# Patient Record
Sex: MALE | Race: WHITE | Employment: FULL TIME | URBAN - NONMETROPOLITAN AREA
[De-identification: names, ages, dates, MRNs, and addresses within clinical notes are randomized per-mention and may not be internally consistent; named-entity substitution may affect disease eponyms.]

---

## 2018-01-09 ENCOUNTER — HOSPITAL ENCOUNTER (OUTPATIENT)
Dept: PREADMISSION TESTING | Age: 43
Discharge: HOME | End: 2018-01-09
Payer: COMMERCIAL

## 2018-01-09 ENCOUNTER — HOSPITAL ENCOUNTER (OUTPATIENT)
Dept: GENERAL RADIOLOGY | Age: 43
Discharge: HOME | End: 2018-01-09
Payer: COMMERCIAL

## 2018-01-09 VITALS
RESPIRATION RATE: 16 BRPM | TEMPERATURE: 97.6 F | OXYGEN SATURATION: 96 % | WEIGHT: 291.01 LBS | HEART RATE: 91 BPM | SYSTOLIC BLOOD PRESSURE: 109 MMHG | BODY MASS INDEX: 45.67 KG/M2 | DIASTOLIC BLOOD PRESSURE: 73 MMHG | HEIGHT: 67 IN

## 2018-01-09 DIAGNOSIS — Z01.818 PRE-OP TESTING: ICD-10-CM

## 2018-01-09 LAB
ANION GAP SERPL CALCULATED.3IONS-SCNC: 16 MEQ/L (ref 8–16)
APTT: 29.8 SECONDS (ref 22–38)
AVERAGE GLUCOSE: 150 MG/DL (ref 70–126)
BUN BLDV-MCNC: 21 MG/DL (ref 7–22)
CALCIUM SERPL-MCNC: 9.9 MG/DL (ref 8.5–10.5)
CHLORIDE BLD-SCNC: 96 MEQ/L (ref 98–111)
CO2: 28 MEQ/L (ref 23–33)
CREAT SERPL-MCNC: 0.8 MG/DL (ref 0.4–1.2)
EKG ATRIAL RATE: 90 BPM
EKG P AXIS: 56 DEGREES
EKG P-R INTERVAL: 122 MS
EKG Q-T INTERVAL: 348 MS
EKG QRS DURATION: 86 MS
EKG QTC CALCULATION (BAZETT): 425 MS
EKG R AXIS: 62 DEGREES
EKG T AXIS: 20 DEGREES
EKG VENTRICULAR RATE: 90 BPM
GFR SERPL CREATININE-BSD FRML MDRD: > 90 ML/MIN/1.73M2
GLUCOSE BLD-MCNC: 129 MG/DL (ref 70–108)
HBA1C MFR BLD: 7 % (ref 4.4–6.4)
HCT VFR BLD CALC: 46.1 % (ref 42–52)
HEMOGLOBIN: 15.7 GM/DL (ref 14–18)
INR BLD: 0.96 (ref 0.85–1.13)
MCH RBC QN AUTO: 29.1 PG (ref 27–31)
MCHC RBC AUTO-ENTMCNC: 34.1 GM/DL (ref 33–37)
MCV RBC AUTO: 85.3 FL (ref 80–94)
MRSA NASAL SCREEN RT-PCR: NEGATIVE
PDW BLD-RTO: 13.5 % (ref 11.5–14.5)
PLATELET # BLD: 293 THOU/MM3 (ref 130–400)
PMV BLD AUTO: 8.1 MCM (ref 7.4–10.4)
POTASSIUM SERPL-SCNC: 4.4 MEQ/L (ref 3.5–5.2)
RBC # BLD: 5.41 MILL/MM3 (ref 4.7–6.1)
SODIUM BLD-SCNC: 140 MEQ/L (ref 135–145)
STAPH AUREUS SCREEN RT-PCR: POSITIVE
WBC # BLD: 12.8 THOU/MM3 (ref 4.8–10.8)

## 2018-01-09 PROCEDURE — 93005 ELECTROCARDIOGRAM TRACING: CPT

## 2018-01-09 PROCEDURE — 85610 PROTHROMBIN TIME: CPT

## 2018-01-09 PROCEDURE — 80048 BASIC METABOLIC PNL TOTAL CA: CPT

## 2018-01-09 PROCEDURE — 87081 CULTURE SCREEN ONLY: CPT

## 2018-01-09 PROCEDURE — 85730 THROMBOPLASTIN TIME PARTIAL: CPT

## 2018-01-09 PROCEDURE — 85027 COMPLETE CBC AUTOMATED: CPT

## 2018-01-09 PROCEDURE — 83036 HEMOGLOBIN GLYCOSYLATED A1C: CPT

## 2018-01-09 PROCEDURE — 87641 MR-STAPH DNA AMP PROBE: CPT

## 2018-01-09 PROCEDURE — 36415 COLL VENOUS BLD VENIPUNCTURE: CPT

## 2018-01-09 PROCEDURE — 71046 X-RAY EXAM CHEST 2 VIEWS: CPT

## 2018-01-09 RX ORDER — DULOXETIN HYDROCHLORIDE 30 MG/1
30 CAPSULE, DELAYED RELEASE ORAL DAILY
COMMUNITY

## 2018-01-09 RX ORDER — OMEGA-3/DHA/EPA/FISH OIL 60 MG-90MG
1 CAPSULE ORAL DAILY
COMMUNITY

## 2018-01-09 RX ORDER — ARMODAFINIL 150 MG/1
200 TABLET ORAL DAILY
COMMUNITY

## 2018-01-09 ASSESSMENT — PAIN SCALES - GENERAL: PAINLEVEL_OUTOF10: 4

## 2018-01-09 ASSESSMENT — PAIN DESCRIPTION - FREQUENCY: FREQUENCY: CONTINUOUS

## 2018-01-09 ASSESSMENT — PAIN DESCRIPTION - PROGRESSION: CLINICAL_PROGRESSION: RAPIDLY WORSENING

## 2018-01-09 ASSESSMENT — PAIN DESCRIPTION - LOCATION: LOCATION: BACK

## 2018-01-09 ASSESSMENT — PAIN DESCRIPTION - DESCRIPTORS: DESCRIPTORS: STABBING;SHARP

## 2018-01-09 ASSESSMENT — PAIN DESCRIPTION - ORIENTATION: ORIENTATION: LOWER;LEFT;RIGHT

## 2018-01-09 ASSESSMENT — PAIN DESCRIPTION - PAIN TYPE: TYPE: CHRONIC PAIN

## 2018-01-09 NOTE — PROGRESS NOTES
In preparation for their surgical procedure above patient was screened for Obstructive Sleep Apnea (BRANDI) using the STOP-Bang Questionnaire by the Pre-Admission Testing department. This is a pre-surgical screening tool for patient safety and serves as a recommendation, this WILL NOT cause cancellation of surgery. STOP-Bang Questionnaire  * Do you currently see a pulmonologist?  Yes     If yes STOP, do not complete. Patient follows with Sleep clinic at [facility] . Uses CPAP, patient instructed to bring it with him for surgery, understanding verbalized        Adapted from:   STOP Questionnaire: A Tool to Screen Patients for Obstructive Sleep Apnea   NABEEL Zavala.P.C., ROSHAN Quinn.B.B.S., Edgardo Hazel M.D., [facility] . Jayla Ling, Ph.D., Brigitte Figueroa M.B.B.S., Jimenez Cook M.Sc., Vignesh Saavedra M.D., Reyna Beckwith. DAVID Woo.C.P.C.    Anesthesiology 2008; 765:830-31 Copyright 2008, the [address] of Anesthesiologists, [place] [address].   ----------------------------------------------------------------------------------------------------------------

## 2018-01-11 LAB — MRSA SCREEN: NORMAL

## 2018-01-11 NOTE — PROGRESS NOTES
Keeping You Safe for Surgery    Staphylococcus aureus or Nayana Holes is a germ that lives on the skin and in the nose of some healthy people. Your skin protects you from those germs. However, when you have surgery, we will be cutting your skin. Sometimes germs can get into those cuts and cause infection. How do we screen for Staph? We will swab your nose to see if you are a carrier of Staph. The results will be available about 2 hours after we obtain the nasal specimen. A positive test does not mean you have an infection; it just means you are a carrier of Staph. Your surgery most likely will not be canceled or delayed. If my test is positive, what happens? If your test is positive, a nurse will call you and tell you to get Mupirocin Ointment (Bactroban) at your pharmacy. The medicine may come in one large tube or may come in many small packets. When the medication is administered into your nose, it works by killing some of the germs that could cause you to get a surgical site infection.  If you get a big tube of Mupirocin, place enough medicine to cover the tip of a cotton swab (Q-tip). Place the cotton swab inside one nostril and rub the medicine onto the inside of the nose. Then repeat this same procedure in your other nostril.  If you get small individual tubes, put half the tube on a cotton swab and put the medicine in one nostril. Then the other half in the other nostril. After the medication has been inserted into each nostril, gently press your nose together and release for about a minute to get the medicine all over the inside of your nose. Do this once in the morning and once at night for 5 days prior to your scheduled surgery date. If I have Staph, will I be treated differently in the hospital?  If you have a certain type of Staph called MRSA (Methicillin-Resistant Staph aureus), you will be in a single room on Contact Precautions.  This means your doctors and nurses will wear gloves and gowns when taking care of you. We do this (along with good hand hygiene) to make sure we do not spread MRSA to any another patients in our care. SURGERY PREPARATION CHECKLIST     NAME: ________________________________________     DATE OF SURGERY: ____________________________    Enter Dates, Check (?) circles to indicate task is completed. Date MUPIROCIN NASAL OINTMENT BODY CLEANSING     DAY 1 ______________________   Morning    Evening          Day 2 ______________________   Morning     Evening        Day 3 ______________________   Morning  Evening        Day 4 ______________________   Morning    Evening        Day 5 ______________________   Morning  Evening        Day 6 ______________________  (Day of Surgery)               PLEASE COMPLETE and BRING THIS CHECKLIST WITH YOU TO [address] to give to your nurse on the day of surgery. You will be notified if you need to use Mupirocin Nasal Ointment.

## 2018-01-23 ENCOUNTER — APPOINTMENT (OUTPATIENT)
Dept: GENERAL RADIOLOGY | Age: 43
DRG: 460 | End: 2018-01-23
Attending: ORTHOPAEDIC SURGERY
Payer: COMMERCIAL

## 2018-01-23 ENCOUNTER — ANESTHESIA (OUTPATIENT)
Dept: OPERATING ROOM | Age: 43
DRG: 460 | End: 2018-01-23
Payer: COMMERCIAL

## 2018-01-23 ENCOUNTER — ANESTHESIA EVENT (OUTPATIENT)
Dept: OPERATING ROOM | Age: 43
DRG: 460 | End: 2018-01-23
Payer: COMMERCIAL

## 2018-01-23 ENCOUNTER — HOSPITAL ENCOUNTER (INPATIENT)
Age: 43
LOS: 3 days | Discharge: HOME | DRG: 460 | End: 2018-01-26
Attending: ORTHOPAEDIC SURGERY | Admitting: ORTHOPAEDIC SURGERY
Payer: COMMERCIAL

## 2018-01-23 VITALS
DIASTOLIC BLOOD PRESSURE: 86 MMHG | SYSTOLIC BLOOD PRESSURE: 172 MMHG | TEMPERATURE: 98.1 F | RESPIRATION RATE: 3 BRPM | OXYGEN SATURATION: 98 %

## 2018-01-23 PROBLEM — M48.061 DEGENERATIVE LUMBAR SPINAL STENOSIS: Status: ACTIVE | Noted: 2018-01-23

## 2018-01-23 LAB
ABO: NORMAL
ANTIBODY SCREEN: NORMAL
GLUCOSE BLD-MCNC: 133 MG/DL (ref 70–108)
GLUCOSE BLD-MCNC: 298 MG/DL (ref 70–108)
RH FACTOR: NORMAL

## 2018-01-23 PROCEDURE — 72020 X-RAY EXAM OF SPINE 1 VIEW: CPT

## 2018-01-23 PROCEDURE — 6370000000 HC RX 637 (ALT 250 FOR IP): Performed by: ORTHOPAEDIC SURGERY

## 2018-01-23 PROCEDURE — 3600000005 HC SURGERY LEVEL 5 BASE: Performed by: ORTHOPAEDIC SURGERY

## 2018-01-23 PROCEDURE — 36415 COLL VENOUS BLD VENIPUNCTURE: CPT

## 2018-01-23 PROCEDURE — 3600000015 HC SURGERY LEVEL 5 ADDTL 15MIN: Performed by: ORTHOPAEDIC SURGERY

## 2018-01-23 PROCEDURE — 6370000000 HC RX 637 (ALT 250 FOR IP): Performed by: PHYSICIAN ASSISTANT

## 2018-01-23 PROCEDURE — 2580000003 HC RX 258: Performed by: PHYSICIAN ASSISTANT

## 2018-01-23 PROCEDURE — 2500000003 HC RX 250 WO HCPCS: Performed by: ORTHOPAEDIC SURGERY

## 2018-01-23 PROCEDURE — 6360000002 HC RX W HCPCS: Performed by: ORTHOPAEDIC SURGERY

## 2018-01-23 PROCEDURE — 3700000001 HC ADD 15 MINUTES (ANESTHESIA): Performed by: ORTHOPAEDIC SURGERY

## 2018-01-23 PROCEDURE — 1200000000 HC SEMI PRIVATE

## 2018-01-23 PROCEDURE — 3700000000 HC ANESTHESIA ATTENDED CARE: Performed by: ORTHOPAEDIC SURGERY

## 2018-01-23 PROCEDURE — 6360000002 HC RX W HCPCS: Performed by: PHYSICIAN ASSISTANT

## 2018-01-23 PROCEDURE — 2720000010 HC SURG SUPPLY STERILE: Performed by: ORTHOPAEDIC SURGERY

## 2018-01-23 PROCEDURE — 6360000002 HC RX W HCPCS: Performed by: NURSE ANESTHETIST, CERTIFIED REGISTERED

## 2018-01-23 PROCEDURE — 7100000000 HC PACU RECOVERY - FIRST 15 MIN: Performed by: ORTHOPAEDIC SURGERY

## 2018-01-23 PROCEDURE — 6360000002 HC RX W HCPCS: Performed by: INTERNAL MEDICINE

## 2018-01-23 PROCEDURE — A6252 ABSORPT DRG >16 <=48 W/O BDR: HCPCS | Performed by: ORTHOPAEDIC SURGERY

## 2018-01-23 PROCEDURE — 86900 BLOOD TYPING SEROLOGIC ABO: CPT

## 2018-01-23 PROCEDURE — 95940 IONM IN OPERATNG ROOM 15 MIN: CPT | Performed by: ORTHOPAEDIC SURGERY

## 2018-01-23 PROCEDURE — 2500000003 HC RX 250 WO HCPCS: Performed by: NURSE ANESTHETIST, CERTIFIED REGISTERED

## 2018-01-23 PROCEDURE — 00QT0ZZ REPAIR SPINAL MENINGES, OPEN APPROACH: ICD-10-PCS | Performed by: ORTHOPAEDIC SURGERY

## 2018-01-23 PROCEDURE — 7100000001 HC PACU RECOVERY - ADDTL 15 MIN: Performed by: ORTHOPAEDIC SURGERY

## 2018-01-23 PROCEDURE — 0SG1071 FUSION OF 2 OR MORE LUMBAR VERTEBRAL JOINTS WITH AUTOLOGOUS TISSUE SUBSTITUTE, POSTERIOR APPROACH, POSTERIOR COLUMN, OPEN APPROACH: ICD-10-PCS | Performed by: ORTHOPAEDIC SURGERY

## 2018-01-23 PROCEDURE — 6370000000 HC RX 637 (ALT 250 FOR IP): Performed by: INTERNAL MEDICINE

## 2018-01-23 PROCEDURE — C1713 ANCHOR/SCREW BN/BN,TIS/BN: HCPCS | Performed by: ORTHOPAEDIC SURGERY

## 2018-01-23 PROCEDURE — 86901 BLOOD TYPING SEROLOGIC RH(D): CPT

## 2018-01-23 PROCEDURE — 82948 REAGENT STRIP/BLOOD GLUCOSE: CPT

## 2018-01-23 PROCEDURE — 2500000003 HC RX 250 WO HCPCS: Performed by: ANESTHESIOLOGY

## 2018-01-23 PROCEDURE — 2580000003 HC RX 258: Performed by: ORTHOPAEDIC SURGERY

## 2018-01-23 PROCEDURE — 86850 RBC ANTIBODY SCREEN: CPT

## 2018-01-23 PROCEDURE — 2580000003 HC RX 258: Performed by: NURSE ANESTHETIST, CERTIFIED REGISTERED

## 2018-01-23 DEVICE — ROD 8690100 CDH PBNT 5.5X100 TI
Type: IMPLANTABLE DEVICE | Site: SPINE LUMBAR | Status: FUNCTIONAL
Brand: CD HORIZON® SPINAL SYSTEM

## 2018-01-23 DEVICE — BONE GRAFT KIT 7510800 INFUSE LARGE II
Type: IMPLANTABLE DEVICE | Site: SPINE LUMBAR | Status: FUNCTIONAL
Brand: INFUSE® BONE GRAFT

## 2018-01-23 DEVICE — DBM T42200 2.5CMX10CM 2 EACH GRAFTON MAT
Type: IMPLANTABLE DEVICE | Site: SPINE LUMBAR | Status: FUNCTIONAL
Brand: GRAFTON®AND GRAFTON PLUS®DEMINERALIZED BONE MATRIX (DBM)

## 2018-01-23 DEVICE — CROSSLINK 811-321 LP MLTSP PL L 1.551.75
Type: IMPLANTABLE DEVICE | Site: SPINE LUMBAR | Status: FUNCTIONAL
Brand: TSRH® SPINAL SYSTEM

## 2018-01-23 RX ORDER — SODIUM CHLORIDE 9 MG/ML
INJECTION, SOLUTION INTRAVENOUS CONTINUOUS
Status: DISCONTINUED | OUTPATIENT
Start: 2018-01-23 | End: 2018-01-23

## 2018-01-23 RX ORDER — SODIUM CHLORIDE 9 MG/ML
INJECTION, SOLUTION INTRAVENOUS CONTINUOUS PRN
Status: DISCONTINUED | OUTPATIENT
Start: 2018-01-23 | End: 2018-01-23 | Stop reason: SDUPTHER

## 2018-01-23 RX ORDER — MIDAZOLAM HYDROCHLORIDE 1 MG/ML
INJECTION INTRAMUSCULAR; INTRAVENOUS PRN
Status: DISCONTINUED | OUTPATIENT
Start: 2018-01-23 | End: 2018-01-23 | Stop reason: SDUPTHER

## 2018-01-23 RX ORDER — PROMETHAZINE HYDROCHLORIDE 25 MG/ML
12.5 INJECTION, SOLUTION INTRAMUSCULAR; INTRAVENOUS
Status: DISCONTINUED | OUTPATIENT
Start: 2018-01-23 | End: 2018-01-23 | Stop reason: HOSPADM

## 2018-01-23 RX ORDER — DOCUSATE SODIUM 100 MG/1
100 CAPSULE, LIQUID FILLED ORAL 2 TIMES DAILY
Status: DISCONTINUED | OUTPATIENT
Start: 2018-01-23 | End: 2018-01-26 | Stop reason: HOSPADM

## 2018-01-23 RX ORDER — NEOSTIGMINE METHYLSULFATE 1 MG/ML
INJECTION, SOLUTION INTRAVENOUS PRN
Status: DISCONTINUED | OUTPATIENT
Start: 2018-01-23 | End: 2018-01-23 | Stop reason: SDUPTHER

## 2018-01-23 RX ORDER — OXYCODONE HYDROCHLORIDE AND ACETAMINOPHEN 5; 325 MG/1; MG/1
1 TABLET ORAL EVERY 4 HOURS PRN
Status: DISCONTINUED | OUTPATIENT
Start: 2018-01-23 | End: 2018-01-26 | Stop reason: HOSPADM

## 2018-01-23 RX ORDER — DEXTROSE MONOHYDRATE 25 G/50ML
12.5 INJECTION, SOLUTION INTRAVENOUS PRN
Status: DISCONTINUED | OUTPATIENT
Start: 2018-01-23 | End: 2018-01-26 | Stop reason: HOSPADM

## 2018-01-23 RX ORDER — KETAMINE HYDROCHLORIDE 50 MG/ML
INJECTION, SOLUTION, CONCENTRATE INTRAMUSCULAR; INTRAVENOUS PRN
Status: DISCONTINUED | OUTPATIENT
Start: 2018-01-23 | End: 2018-01-23 | Stop reason: SDUPTHER

## 2018-01-23 RX ORDER — DEXTROSE AND SODIUM CHLORIDE 5; .9 G/100ML; G/100ML
INJECTION, SOLUTION INTRAVENOUS PRN
Status: DISCONTINUED | OUTPATIENT
Start: 2018-01-23 | End: 2018-01-26 | Stop reason: HOSPADM

## 2018-01-23 RX ORDER — SIMVASTATIN 40 MG
40 TABLET ORAL NIGHTLY
Status: DISCONTINUED | OUTPATIENT
Start: 2018-01-23 | End: 2018-01-26 | Stop reason: HOSPADM

## 2018-01-23 RX ORDER — FAMOTIDINE 20 MG/1
20 TABLET, FILM COATED ORAL 2 TIMES DAILY
Status: DISCONTINUED | OUTPATIENT
Start: 2018-01-23 | End: 2018-01-26 | Stop reason: HOSPADM

## 2018-01-23 RX ORDER — MORPHINE SULFATE 4 MG/ML
4 INJECTION, SOLUTION INTRAMUSCULAR; INTRAVENOUS
Status: ACTIVE | OUTPATIENT
Start: 2018-01-23 | End: 2018-01-24

## 2018-01-23 RX ORDER — ACETAMINOPHEN 325 MG/1
650 TABLET ORAL EVERY 4 HOURS PRN
Status: DISCONTINUED | OUTPATIENT
Start: 2018-01-23 | End: 2018-01-26 | Stop reason: HOSPADM

## 2018-01-23 RX ORDER — DIPHENHYDRAMINE HYDROCHLORIDE 50 MG/ML
12.5 INJECTION INTRAMUSCULAR; INTRAVENOUS
Status: DISCONTINUED | OUTPATIENT
Start: 2018-01-23 | End: 2018-01-23 | Stop reason: HOSPADM

## 2018-01-23 RX ORDER — SODIUM CHLORIDE 0.9 % (FLUSH) 0.9 %
10 SYRINGE (ML) INJECTION EVERY 12 HOURS SCHEDULED
Status: DISCONTINUED | OUTPATIENT
Start: 2018-01-23 | End: 2018-01-26 | Stop reason: HOSPADM

## 2018-01-23 RX ORDER — LIDOCAINE HYDROCHLORIDE 20 MG/ML
INJECTION, SOLUTION INFILTRATION; PERINEURAL PRN
Status: DISCONTINUED | OUTPATIENT
Start: 2018-01-23 | End: 2018-01-23 | Stop reason: SDUPTHER

## 2018-01-23 RX ORDER — HYDROMORPHONE HCL 110MG/55ML
PATIENT CONTROLLED ANALGESIA SYRINGE INTRAVENOUS PRN
Status: DISCONTINUED | OUTPATIENT
Start: 2018-01-23 | End: 2018-01-23 | Stop reason: SDUPTHER

## 2018-01-23 RX ORDER — MEPERIDINE HYDROCHLORIDE 25 MG/ML
25 INJECTION INTRAMUSCULAR; INTRAVENOUS; SUBCUTANEOUS
Status: DISCONTINUED | OUTPATIENT
Start: 2018-01-23 | End: 2018-01-23 | Stop reason: HOSPADM

## 2018-01-23 RX ORDER — GLYCOPYRROLATE 0.2 MG/ML
INJECTION INTRAMUSCULAR; INTRAVENOUS PRN
Status: DISCONTINUED | OUTPATIENT
Start: 2018-01-23 | End: 2018-01-23 | Stop reason: SDUPTHER

## 2018-01-23 RX ORDER — OXYCODONE HYDROCHLORIDE AND ACETAMINOPHEN 5; 325 MG/1; MG/1
2 TABLET ORAL EVERY 4 HOURS PRN
Status: DISCONTINUED | OUTPATIENT
Start: 2018-01-23 | End: 2018-01-26 | Stop reason: HOSPADM

## 2018-01-23 RX ORDER — SODIUM CHLORIDE 0.9 % (FLUSH) 0.9 %
10 SYRINGE (ML) INJECTION PRN
Status: DISCONTINUED | OUTPATIENT
Start: 2018-01-23 | End: 2018-01-26 | Stop reason: HOSPADM

## 2018-01-23 RX ORDER — ONDANSETRON 2 MG/ML
4 INJECTION INTRAMUSCULAR; INTRAVENOUS EVERY 6 HOURS PRN
Status: DISCONTINUED | OUTPATIENT
Start: 2018-01-23 | End: 2018-01-26 | Stop reason: HOSPADM

## 2018-01-23 RX ORDER — CITALOPRAM 20 MG/1
20 TABLET ORAL DAILY
Status: DISCONTINUED | OUTPATIENT
Start: 2018-01-23 | End: 2018-01-26 | Stop reason: HOSPADM

## 2018-01-23 RX ORDER — DIAZEPAM 5 MG/1
5 TABLET ORAL EVERY 6 HOURS PRN
Status: DISCONTINUED | OUTPATIENT
Start: 2018-01-23 | End: 2018-01-26 | Stop reason: HOSPADM

## 2018-01-23 RX ORDER — CETIRIZINE HYDROCHLORIDE 10 MG/1
10 TABLET ORAL DAILY PRN
Status: DISCONTINUED | OUTPATIENT
Start: 2018-01-23 | End: 2018-01-26 | Stop reason: HOSPADM

## 2018-01-23 RX ORDER — PROPOFOL 10 MG/ML
INJECTION, EMULSION INTRAVENOUS PRN
Status: DISCONTINUED | OUTPATIENT
Start: 2018-01-23 | End: 2018-01-23 | Stop reason: SDUPTHER

## 2018-01-23 RX ORDER — SODIUM CHLORIDE 0.9 % (FLUSH) 0.9 %
10 SYRINGE (ML) INJECTION EVERY 12 HOURS SCHEDULED
Status: DISCONTINUED | OUTPATIENT
Start: 2018-01-23 | End: 2018-01-23 | Stop reason: HOSPADM

## 2018-01-23 RX ORDER — ACETAMINOPHEN 650 MG/1
650 SUPPOSITORY RECTAL EVERY 4 HOURS PRN
Status: DISCONTINUED | OUTPATIENT
Start: 2018-01-23 | End: 2018-01-26 | Stop reason: HOSPADM

## 2018-01-23 RX ORDER — FENTANYL CITRATE 50 UG/ML
INJECTION, SOLUTION INTRAMUSCULAR; INTRAVENOUS PRN
Status: DISCONTINUED | OUTPATIENT
Start: 2018-01-23 | End: 2018-01-23 | Stop reason: SDUPTHER

## 2018-01-23 RX ORDER — NICOTINE POLACRILEX 4 MG
15 LOZENGE BUCCAL PRN
Status: DISCONTINUED | OUTPATIENT
Start: 2018-01-23 | End: 2018-01-26 | Stop reason: HOSPADM

## 2018-01-23 RX ORDER — DEXTROSE, SODIUM CHLORIDE, AND POTASSIUM CHLORIDE 5; .45; .15 G/100ML; G/100ML; G/100ML
INJECTION INTRAVENOUS CONTINUOUS
Status: DISCONTINUED | OUTPATIENT
Start: 2018-01-23 | End: 2018-01-23

## 2018-01-23 RX ORDER — MORPHINE SULFATE 2 MG/ML
2 INJECTION, SOLUTION INTRAMUSCULAR; INTRAVENOUS
Status: DISPENSED | OUTPATIENT
Start: 2018-01-23 | End: 2018-01-24

## 2018-01-23 RX ORDER — ROCURONIUM BROMIDE 10 MG/ML
INJECTION, SOLUTION INTRAVENOUS PRN
Status: DISCONTINUED | OUTPATIENT
Start: 2018-01-23 | End: 2018-01-23 | Stop reason: SDUPTHER

## 2018-01-23 RX ORDER — SODIUM CHLORIDE 0.9 % (FLUSH) 0.9 %
10 SYRINGE (ML) INJECTION PRN
Status: DISCONTINUED | OUTPATIENT
Start: 2018-01-23 | End: 2018-01-23 | Stop reason: HOSPADM

## 2018-01-23 RX ORDER — LABETALOL HYDROCHLORIDE 5 MG/ML
5 INJECTION, SOLUTION INTRAVENOUS EVERY 10 MIN PRN
Status: DISCONTINUED | OUTPATIENT
Start: 2018-01-23 | End: 2018-01-23 | Stop reason: HOSPADM

## 2018-01-23 RX ORDER — DEXTROSE MONOHYDRATE 50 MG/ML
100 INJECTION, SOLUTION INTRAVENOUS PRN
Status: DISCONTINUED | OUTPATIENT
Start: 2018-01-23 | End: 2018-01-23 | Stop reason: RX

## 2018-01-23 RX ORDER — DEXAMETHASONE SODIUM PHOSPHATE 4 MG/ML
INJECTION, SOLUTION INTRA-ARTICULAR; INTRALESIONAL; INTRAMUSCULAR; INTRAVENOUS; SOFT TISSUE PRN
Status: DISCONTINUED | OUTPATIENT
Start: 2018-01-23 | End: 2018-01-23 | Stop reason: SDUPTHER

## 2018-01-23 RX ORDER — FENTANYL CITRATE 50 UG/ML
25 INJECTION, SOLUTION INTRAMUSCULAR; INTRAVENOUS EVERY 5 MIN PRN
Status: DISCONTINUED | OUTPATIENT
Start: 2018-01-23 | End: 2018-01-23 | Stop reason: HOSPADM

## 2018-01-23 RX ORDER — DULOXETIN HYDROCHLORIDE 30 MG/1
30 CAPSULE, DELAYED RELEASE ORAL DAILY
Status: DISCONTINUED | OUTPATIENT
Start: 2018-01-23 | End: 2018-01-26 | Stop reason: HOSPADM

## 2018-01-23 RX ORDER — ONDANSETRON 2 MG/ML
INJECTION INTRAMUSCULAR; INTRAVENOUS PRN
Status: DISCONTINUED | OUTPATIENT
Start: 2018-01-23 | End: 2018-01-23 | Stop reason: SDUPTHER

## 2018-01-23 RX ORDER — BISACODYL 10 MG
10 SUPPOSITORY, RECTAL RECTAL DAILY PRN
Status: DISCONTINUED | OUTPATIENT
Start: 2018-01-23 | End: 2018-01-26 | Stop reason: HOSPADM

## 2018-01-23 RX ORDER — FENTANYL CITRATE 50 UG/ML
50 INJECTION, SOLUTION INTRAMUSCULAR; INTRAVENOUS EVERY 5 MIN PRN
Status: DISCONTINUED | OUTPATIENT
Start: 2018-01-23 | End: 2018-01-23 | Stop reason: HOSPADM

## 2018-01-23 RX ORDER — SODIUM CHLORIDE AND POTASSIUM CHLORIDE .9; .15 G/100ML; G/100ML
SOLUTION INTRAVENOUS CONTINUOUS
Status: DISCONTINUED | OUTPATIENT
Start: 2018-01-23 | End: 2018-01-25

## 2018-01-23 RX ADMIN — FAMOTIDINE 20 MG: 20 TABLET, FILM COATED ORAL at 22:00

## 2018-01-23 RX ADMIN — LABETALOL HYDROCHLORIDE 5 MG: 5 INJECTION INTRAVENOUS at 19:22

## 2018-01-23 RX ADMIN — FENTANYL CITRATE 100 MCG: 50 INJECTION INTRAMUSCULAR; INTRAVENOUS at 15:30

## 2018-01-23 RX ADMIN — PHENYLEPHRINE HYDROCHLORIDE 100 MCG: 10 INJECTION INTRAMUSCULAR; INTRAVENOUS; SUBCUTANEOUS at 16:00

## 2018-01-23 RX ADMIN — CITALOPRAM 20 MG: 20 TABLET, FILM COATED ORAL at 22:00

## 2018-01-23 RX ADMIN — CEFAZOLIN SODIUM 3 G: 2 SOLUTION INTRAVENOUS at 15:30

## 2018-01-23 RX ADMIN — HYDROMORPHONE HYDROCHLORIDE 0.5 MG: 2 INJECTION INTRAMUSCULAR; INTRAVENOUS; SUBCUTANEOUS at 17:10

## 2018-01-23 RX ADMIN — HYDROMORPHONE HYDROCHLORIDE 0.25 MG: 2 INJECTION INTRAMUSCULAR; INTRAVENOUS; SUBCUTANEOUS at 15:45

## 2018-01-23 RX ADMIN — GLYCOPYRROLATE 0.2 MG: 0.2 INJECTION, SOLUTION INTRAMUSCULAR; INTRAVENOUS at 17:50

## 2018-01-23 RX ADMIN — MORPHINE SULFATE 2 MG: 2 INJECTION, SOLUTION INTRAMUSCULAR; INTRAVENOUS at 21:24

## 2018-01-23 RX ADMIN — CEFAZOLIN SODIUM 1 G: 1 INJECTION, SOLUTION INTRAVENOUS at 23:38

## 2018-01-23 RX ADMIN — KETAMINE HYDROCHLORIDE 50 MG: 50 INJECTION, SOLUTION INTRAMUSCULAR; INTRAVENOUS at 15:19

## 2018-01-23 RX ADMIN — CEFAZOLIN SODIUM 2 G: 2 SOLUTION INTRAVENOUS at 22:59

## 2018-01-23 RX ADMIN — DULOXETINE HYDROCHLORIDE 30 MG: 30 CAPSULE, DELAYED RELEASE ORAL at 22:00

## 2018-01-23 RX ADMIN — Medication 2 UNITS: at 22:57

## 2018-01-23 RX ADMIN — DEXAMETHASONE SODIUM PHOSPHATE 4 MG: 4 INJECTION, SOLUTION INTRAMUSCULAR; INTRAVENOUS at 15:30

## 2018-01-23 RX ADMIN — LIDOCAINE HYDROCHLORIDE 100 MG: 20 INJECTION, SOLUTION INFILTRATION; PERINEURAL at 15:19

## 2018-01-23 RX ADMIN — Medication 50 MG: at 15:19

## 2018-01-23 RX ADMIN — PROPOFOL 200 MG: 10 INJECTION, EMULSION INTRAVENOUS at 15:19

## 2018-01-23 RX ADMIN — HYDROMORPHONE HYDROCHLORIDE 0.5 MG: 2 INJECTION INTRAMUSCULAR; INTRAVENOUS; SUBCUTANEOUS at 16:20

## 2018-01-23 RX ADMIN — NEOSTIGMINE METHYLSULFATE 2 MG: 1 INJECTION, SOLUTION INTRAVENOUS at 17:50

## 2018-01-23 RX ADMIN — SODIUM CHLORIDE: 9 INJECTION, SOLUTION INTRAVENOUS at 14:04

## 2018-01-23 RX ADMIN — MIDAZOLAM HYDROCHLORIDE 2 MG: 1 INJECTION, SOLUTION INTRAMUSCULAR; INTRAVENOUS at 15:13

## 2018-01-23 RX ADMIN — SODIUM CHLORIDE: 9 INJECTION, SOLUTION INTRAVENOUS at 15:22

## 2018-01-23 RX ADMIN — ONDANSETRON 4 MG: 2 INJECTION INTRAMUSCULAR; INTRAVENOUS at 15:30

## 2018-01-23 RX ADMIN — SIMVASTATIN 40 MG: 40 TABLET, FILM COATED ORAL at 22:00

## 2018-01-23 RX ADMIN — SODIUM CHLORIDE: 9 INJECTION, SOLUTION INTRAVENOUS at 19:57

## 2018-01-23 RX ADMIN — PHENYLEPHRINE HYDROCHLORIDE 100 MCG: 10 INJECTION INTRAMUSCULAR; INTRAVENOUS; SUBCUTANEOUS at 16:35

## 2018-01-23 RX ADMIN — PHENYLEPHRINE HYDROCHLORIDE 100 MCG: 10 INJECTION INTRAMUSCULAR; INTRAVENOUS; SUBCUTANEOUS at 16:10

## 2018-01-23 RX ADMIN — HYDROMORPHONE HYDROCHLORIDE 0.5 MG: 2 INJECTION INTRAMUSCULAR; INTRAVENOUS; SUBCUTANEOUS at 17:30

## 2018-01-23 RX ADMIN — POTASSIUM CHLORIDE AND SODIUM CHLORIDE: 900; 150 INJECTION, SOLUTION INTRAVENOUS at 22:00

## 2018-01-23 RX ADMIN — OXYCODONE HYDROCHLORIDE AND ACETAMINOPHEN 2 TABLET: 5; 325 TABLET ORAL at 23:13

## 2018-01-23 RX ADMIN — Medication 10 ML: at 23:14

## 2018-01-23 RX ADMIN — DOCUSATE SODIUM 100 MG: 100 CAPSULE ORAL at 22:00

## 2018-01-23 RX ADMIN — HYDROMORPHONE HYDROCHLORIDE 0.25 MG: 2 INJECTION INTRAMUSCULAR; INTRAVENOUS; SUBCUTANEOUS at 15:55

## 2018-01-23 ASSESSMENT — PULMONARY FUNCTION TESTS
PIF_VALUE: 23
PIF_VALUE: 23
PIF_VALUE: 22
PIF_VALUE: 3
PIF_VALUE: 22
PIF_VALUE: 23
PIF_VALUE: 23
PIF_VALUE: 24
PIF_VALUE: 26
PIF_VALUE: 24
PIF_VALUE: 5
PIF_VALUE: 22
PIF_VALUE: 22
PIF_VALUE: 25
PIF_VALUE: 23
PIF_VALUE: 25
PIF_VALUE: 25
PIF_VALUE: 24
PIF_VALUE: 26
PIF_VALUE: 22
PIF_VALUE: 14
PIF_VALUE: 24
PIF_VALUE: 25
PIF_VALUE: 24
PIF_VALUE: 23
PIF_VALUE: 25
PIF_VALUE: 24
PIF_VALUE: 21
PIF_VALUE: 24
PIF_VALUE: 4
PIF_VALUE: 0
PIF_VALUE: 2
PIF_VALUE: 23
PIF_VALUE: 23
PIF_VALUE: 22
PIF_VALUE: 24
PIF_VALUE: 22
PIF_VALUE: 23
PIF_VALUE: 3
PIF_VALUE: 23
PIF_VALUE: 24
PIF_VALUE: 23
PIF_VALUE: 2
PIF_VALUE: 25
PIF_VALUE: 1
PIF_VALUE: 22
PIF_VALUE: 23
PIF_VALUE: 22
PIF_VALUE: 22
PIF_VALUE: 24
PIF_VALUE: 24
PIF_VALUE: 23
PIF_VALUE: 6
PIF_VALUE: 23
PIF_VALUE: 27
PIF_VALUE: 23
PIF_VALUE: 23
PIF_VALUE: 25
PIF_VALUE: 23
PIF_VALUE: 22
PIF_VALUE: 23
PIF_VALUE: 23
PIF_VALUE: 22
PIF_VALUE: 24
PIF_VALUE: 23
PIF_VALUE: 23
PIF_VALUE: 6
PIF_VALUE: 4
PIF_VALUE: 22
PIF_VALUE: 22
PIF_VALUE: 27
PIF_VALUE: 23
PIF_VALUE: 27
PIF_VALUE: 23
PIF_VALUE: 26
PIF_VALUE: 25
PIF_VALUE: 23
PIF_VALUE: 3
PIF_VALUE: 25
PIF_VALUE: 25
PIF_VALUE: 24
PIF_VALUE: 23
PIF_VALUE: 25
PIF_VALUE: 23
PIF_VALUE: 3
PIF_VALUE: 25
PIF_VALUE: 22
PIF_VALUE: 22
PIF_VALUE: 24
PIF_VALUE: 24
PIF_VALUE: 23
PIF_VALUE: 22
PIF_VALUE: 24
PIF_VALUE: 20
PIF_VALUE: 23
PIF_VALUE: 25
PIF_VALUE: 22
PIF_VALUE: 3
PIF_VALUE: 23
PIF_VALUE: 25
PIF_VALUE: 22
PIF_VALUE: 23
PIF_VALUE: 24
PIF_VALUE: 0
PIF_VALUE: 24
PIF_VALUE: 25
PIF_VALUE: 27
PIF_VALUE: 24
PIF_VALUE: 24
PIF_VALUE: 3
PIF_VALUE: 4
PIF_VALUE: 24
PIF_VALUE: 21
PIF_VALUE: 24
PIF_VALUE: 21
PIF_VALUE: 23
PIF_VALUE: 7
PIF_VALUE: 25
PIF_VALUE: 22
PIF_VALUE: 23
PIF_VALUE: 22
PIF_VALUE: 22
PIF_VALUE: 24
PIF_VALUE: 22
PIF_VALUE: 23
PIF_VALUE: 23
PIF_VALUE: 26
PIF_VALUE: 23
PIF_VALUE: 22
PIF_VALUE: 23
PIF_VALUE: 24
PIF_VALUE: 23
PIF_VALUE: 24
PIF_VALUE: 22
PIF_VALUE: 23
PIF_VALUE: 25
PIF_VALUE: 24
PIF_VALUE: 22
PIF_VALUE: 22
PIF_VALUE: 0
PIF_VALUE: 21
PIF_VALUE: 30
PIF_VALUE: 23
PIF_VALUE: 26
PIF_VALUE: 25
PIF_VALUE: 25
PIF_VALUE: 24
PIF_VALUE: 25
PIF_VALUE: 22
PIF_VALUE: 23
PIF_VALUE: 22
PIF_VALUE: 22
PIF_VALUE: 23
PIF_VALUE: 27
PIF_VALUE: 3
PIF_VALUE: 25
PIF_VALUE: 24
PIF_VALUE: 22
PIF_VALUE: 24
PIF_VALUE: 23
PIF_VALUE: 24
PIF_VALUE: 23
PIF_VALUE: 22
PIF_VALUE: 1
PIF_VALUE: 23
PIF_VALUE: 3
PIF_VALUE: 22
PIF_VALUE: 23
PIF_VALUE: 26
PIF_VALUE: 22
PIF_VALUE: 23
PIF_VALUE: 22
PIF_VALUE: 24
PIF_VALUE: 25

## 2018-01-23 ASSESSMENT — PAIN DESCRIPTION - PAIN TYPE
TYPE: ACUTE PAIN;SURGICAL PAIN

## 2018-01-23 ASSESSMENT — PAIN DESCRIPTION - FREQUENCY
FREQUENCY: INTERMITTENT
FREQUENCY: INTERMITTENT

## 2018-01-23 ASSESSMENT — PAIN DESCRIPTION - DIRECTION
RADIATING_TOWARDS: DOWN RIGHT LEG
RADIATING_TOWARDS: RIGHT LEG

## 2018-01-23 ASSESSMENT — PAIN SCALES - GENERAL
PAINLEVEL_OUTOF10: 7
PAINLEVEL_OUTOF10: 7
PAINLEVEL_OUTOF10: 6
PAINLEVEL_OUTOF10: 0
PAINLEVEL_OUTOF10: 2
PAINLEVEL_OUTOF10: 3
PAINLEVEL_OUTOF10: 7

## 2018-01-23 ASSESSMENT — PAIN DESCRIPTION - ORIENTATION
ORIENTATION: LOWER

## 2018-01-23 ASSESSMENT — PAIN DESCRIPTION - DESCRIPTORS
DESCRIPTORS: ACHING;BURNING
DESCRIPTORS: ACHING;BURNING
DESCRIPTORS: SHARP

## 2018-01-23 ASSESSMENT — PAIN - FUNCTIONAL ASSESSMENT: PAIN_FUNCTIONAL_ASSESSMENT: 0-10

## 2018-01-23 ASSESSMENT — PAIN SCALES - WONG BAKER: WONGBAKER_NUMERICALRESPONSE: 0

## 2018-01-23 ASSESSMENT — PAIN DESCRIPTION - LOCATION
LOCATION: BACK

## 2018-01-23 NOTE — OP NOTE
[address] [place] [id number]                                             OPERATIVE REPORT     PATIENT NAME: Darwin Schwartz                           : 1975  MED REC NO: [id number]                                         ROOM:      011  ACCOUNT NO:  [de-identified]                                        ADMIT DATE: 2018  PROVIDER:     Jaden Smalls. Alfonso White M.D.     DATE OF PROCEDURE: 2018     PREOPERATIVE DIAGNOSES:  1. L2-5 degenerative disc disease. 2. L2-5 stenosis with neurogenic claudication  3. L2-3 retrolisthesis, grade 1  4. Prior L2-S1 laminectomy  5. Obesity with BMI 44.9     POSTOPERATIVE DIAGNOSES:  1. L2-5 degenerative disc disease. 2. L2-5 stenosis with neurogenic claudication  3. L2-3 retrolisthesis, grade 1  4. Prior L2-S1 laminectomy  5. Obesity with BMI 44.9     OPERATION PERFORMED:   1. L2 through L5 bilateral laminectomy and partial medial facetectomies  and foraminotomies of the L2, L3, L4 and L5 nerve roots along with total facetectomies on the left at L2-L3 for complete decompression of the left L2 nerve roots. 2.  L2 through L5 posterior spine fusion. 3.  L2 through L5 posterior spine instrumentation, Legacy, [employer]. 4.  Use of local autograft bone and [place] Matrix     SURGEON:  Katie White M.D.     ASSISTANT:  Binh Gonzalez PA-C     ANESTHESIA:  General.     INDICATIONS:  This is a male with refractory back and leg pain from lumbar  stenosis and degenerative disc disease from L2 through L5. Patient has tried failed  conservative therapy including medication management, epidural steroid injections, and physical therapy. Due to the persistence of symptoms  and failure of conservative treatment, patient elected to proceed with  surgical treatment. Patient, therefore, understood the indication for the  surgery as well as its risks, benefits, and alternatives.   These up in  the office in six weeks.   At that time, AP and lateral x-rays of the lumbar  spine will be obtained to assess the instrumentation and fusion.        John Lopez MD PhD     Caitlin Hall, [facility], assisted throughout the procedure with positioning,  draping, retraction, wound closure, dressing, and splint application    John Lopez

## 2018-01-23 NOTE — ANESTHESIA PRE PROCEDURE
sodium chloride flush 0.9 % injection 10 mL  10 mL Intravenous PRN HOMERO Barragan        ceFAZolin (ANCEF) 2 g in dextrose 3 % 50 mL IVPB (duplex)  2 g Intravenous On Call to [address]        Followed by   [place] ceFAZolin (ANCEF) 1 g in dextrose 5 % 50 mL IVPB (premix)  1 g Intravenous On Call to [address]        fentaNYL (SUBLIMAZE) injection 25 mcg  25 mcg Intravenous Q5 Min PRN Doyle Elizabeth MD        HYDROmorphone (DILAUDID) injection 0.5 mg  0.5 mg Intravenous Q5 Min PRN Doyle Elizabeth MD        HYDROmorphone (DILAUDID) injection 0.25 mg  0.25 mg Intravenous Q5 Min PRN Doyle Elizabeth MD        fentaNYL (SUBLIMAZE) injection 50 mcg  50 mcg Intravenous Q5 Min PRN Doyle Elizabeth MD        diphenhydrAMINE (BENADRYL) injection 12.5 mg  12.5 mg Intravenous Once PRN Doyle Elizabeth MD        [facility]) injection 12.5 mg  12.5 mg Intramuscular Once PRN Doyle Elizabeth MD        labetalol (NORMODYNE;TRANDATE) injection 5 mg  5 mg Intravenous Q10 Min PRN Doyle Elizabeth MD        meperidine (DEMEROL) injection 25 mg  25 mg Intravenous Once PRN Doyle Elizabeth MD           Allergies:  No Known Allergies    Problem List:    Patient Active Problem List   Diagnosis Code    Hypercholesteremia E78.00    Obstructive sleep apnea on CPAP G47.33, Z99.89    Fatigue R53.83    Lumbar stenosis M48.061    Morbid obesity with BMI of 45.0-49.9, adult ([facility]) E66.01, Z68.42    Degenerative lumbar spinal stenosis M48.061       Past Medical History:        Diagnosis Date    Anxiety     Diabetes mellitus ([facility] [address]     HIGH CHOLESTEROL     Pneumonia 2011    Hospitalized 4 days    Sleep apnea        Past Surgical History:        Procedure Laterality Date    KNEE ARTHROSCOPY  right knee    LUMBAR DISC SURGERY Bilateral 03/12/2013    decompressive lumbar L2-3, L4-5 5-S1 laminectomies and foraminotomies    VASECTOMY  2007    WISDOM TOOTH EXTRACTION         Social History:    Social history of anesthetic complications:   Airway: Mallampati: III  TM distance: >3 FB   Neck ROM: full  Mouth opening: > = 3 FB Dental: normal exam         Pulmonary:   (+) sleep apnea: on CPAP,      (-) pneumonia                           Cardiovascular:        (-) hypertension, past MI, CAD, dysrhythmias and  angina    ECG reviewed               Beta Blocker:  Not on Beta Blocker         Neuro/Psych:   (+) psychiatric history: stable with treatment            GI/Hepatic/Renal:   (+) morbid obesity (BMI 45)          Endo/Other:    (+) Type II DM, well controlled, , .                 Abdominal:           Vascular:                                        Anesthesia Plan      general     ASA 3       Induction: intravenous. MIPS: Postoperative opioids intended and Prophylactic antiemetics administered. Anesthetic plan and risks discussed with patient and spouse. Plan discussed with CRNA. Osbaldo Richards MD   1/23/2018

## 2018-01-23 NOTE — BRIEF OP NOTE
Brief Postoperative Note  ______________________________________________________________    Patient: Kwasi Lugo  YOB: 1975  MRN: [id number]  Date of Procedure: 1/23/2018    Pre-Op Diagnosis:   1. L2-5 DDD and HNP with stenosis and neurogenic claudication  2. Prior L2-S1 laminectomy    Post-Op Diagnosis: Same       Procedure(s):  L2-5 DECOMPRESSION AND FUSION  Durotomy repair    Anesthesia: General    Surgeon(s):  Tanya Pro MD    Staff:  Scrub Person First: Deisi Vizcarra  Scrub Person Second: Litzy Tovar  Physician Assistant: HOMERO Ferguson; Vicente Watson.  CRICKET Callaway     Estimated Blood Loss: 112 mL    Complications:  Durotomy    Specimens:   Was not obtained    Implants:  * No implants in log *      Drains:  Lumbar hemovac x 2    Findings: stenosis    HOMERO Ferguson  Date: 1/23/2018  Time: 4:30 PM

## 2018-01-24 LAB
BUN BLDV-MCNC: 12 MG/DL (ref 7–22)
CALCIUM SERPL-MCNC: 8 MG/DL (ref 8.5–10.5)
CHLORIDE BLD-SCNC: 99 MEQ/L (ref 98–111)
CO2: 27 MEQ/L (ref 23–33)
CREAT SERPL-MCNC: 0.7 MG/DL (ref 0.4–1.2)
GFR SERPL CREATININE-BSD FRML MDRD: > 90 ML/MIN/1.73M2
GLUCOSE BLD-MCNC: 159 MG/DL (ref 70–108)
GLUCOSE BLD-MCNC: 235 MG/DL (ref 70–108)
GLUCOSE BLD-MCNC: 236 MG/DL (ref 70–108)
GLUCOSE BLD-MCNC: 260 MG/DL (ref 70–108)
GLUCOSE BLD-MCNC: 288 MG/DL (ref 70–108)
HCT VFR BLD CALC: 37.5 % (ref 42–52)
HEMOGLOBIN: 12.6 GM/DL (ref 14–18)
POTASSIUM REFLEX MAGNESIUM: 4.7 MEQ/L (ref 3.5–5.2)
SODIUM BLD-SCNC: 136 MEQ/L (ref 135–145)

## 2018-01-24 PROCEDURE — G8988 SELF CARE GOAL STATUS: HCPCS

## 2018-01-24 PROCEDURE — 97535 SELF CARE MNGMENT TRAINING: CPT

## 2018-01-24 PROCEDURE — 97162 PT EVAL MOD COMPLEX 30 MIN: CPT

## 2018-01-24 PROCEDURE — 80048 BASIC METABOLIC PNL TOTAL CA: CPT

## 2018-01-24 PROCEDURE — G8987 SELF CARE CURRENT STATUS: HCPCS

## 2018-01-24 PROCEDURE — 2580000003 HC RX 258: Performed by: PHYSICIAN ASSISTANT

## 2018-01-24 PROCEDURE — 97530 THERAPEUTIC ACTIVITIES: CPT

## 2018-01-24 PROCEDURE — 85014 HEMATOCRIT: CPT

## 2018-01-24 PROCEDURE — 6370000000 HC RX 637 (ALT 250 FOR IP): Performed by: PHYSICIAN ASSISTANT

## 2018-01-24 PROCEDURE — 6370000000 HC RX 637 (ALT 250 FOR IP): Performed by: INTERNAL MEDICINE

## 2018-01-24 PROCEDURE — 6360000002 HC RX W HCPCS: Performed by: PHYSICIAN ASSISTANT

## 2018-01-24 PROCEDURE — 85018 HEMOGLOBIN: CPT

## 2018-01-24 PROCEDURE — G8978 MOBILITY CURRENT STATUS: HCPCS

## 2018-01-24 PROCEDURE — 1200000000 HC SEMI PRIVATE

## 2018-01-24 PROCEDURE — 36415 COLL VENOUS BLD VENIPUNCTURE: CPT

## 2018-01-24 PROCEDURE — 97166 OT EVAL MOD COMPLEX 45 MIN: CPT

## 2018-01-24 PROCEDURE — 97110 THERAPEUTIC EXERCISES: CPT

## 2018-01-24 PROCEDURE — 82948 REAGENT STRIP/BLOOD GLUCOSE: CPT

## 2018-01-24 PROCEDURE — G8979 MOBILITY GOAL STATUS: HCPCS

## 2018-01-24 PROCEDURE — 2700000000 HC OXYGEN THERAPY PER DAY

## 2018-01-24 RX ORDER — SENNA PLUS 8.6 MG/1
1 TABLET ORAL 2 TIMES DAILY
Status: DISCONTINUED | OUTPATIENT
Start: 2018-01-24 | End: 2018-01-26 | Stop reason: HOSPADM

## 2018-01-24 RX ADMIN — MAGNESIUM HYDROXIDE 30 ML: 400 SUSPENSION ORAL at 18:03

## 2018-01-24 RX ADMIN — DOCUSATE SODIUM 100 MG: 100 CAPSULE ORAL at 20:59

## 2018-01-24 RX ADMIN — FAMOTIDINE 20 MG: 20 TABLET, FILM COATED ORAL at 08:00

## 2018-01-24 RX ADMIN — SIMVASTATIN 40 MG: 40 TABLET, FILM COATED ORAL at 20:59

## 2018-01-24 RX ADMIN — INSULIN LISPRO 3 UNITS: 100 INJECTION, SOLUTION INTRAVENOUS; SUBCUTANEOUS at 12:08

## 2018-01-24 RX ADMIN — SENNA 8.6 MG: 8.6 TABLET, COATED ORAL at 18:03

## 2018-01-24 RX ADMIN — OXYCODONE HYDROCHLORIDE AND ACETAMINOPHEN 2 TABLET: 5; 325 TABLET ORAL at 16:28

## 2018-01-24 RX ADMIN — OXYCODONE HYDROCHLORIDE AND ACETAMINOPHEN 2 TABLET: 5; 325 TABLET ORAL at 12:07

## 2018-01-24 RX ADMIN — CITALOPRAM 20 MG: 20 TABLET, FILM COATED ORAL at 08:00

## 2018-01-24 RX ADMIN — METFORMIN HYDROCHLORIDE 1000 MG: 500 TABLET ORAL at 16:26

## 2018-01-24 RX ADMIN — Medication 10 ML: at 20:59

## 2018-01-24 RX ADMIN — CEFAZOLIN SODIUM 1 G: 1 INJECTION, SOLUTION INTRAVENOUS at 08:47

## 2018-01-24 RX ADMIN — OXYCODONE HYDROCHLORIDE AND ACETAMINOPHEN 2 TABLET: 5; 325 TABLET ORAL at 22:37

## 2018-01-24 RX ADMIN — METFORMIN HYDROCHLORIDE 1000 MG: 500 TABLET ORAL at 08:00

## 2018-01-24 RX ADMIN — FAMOTIDINE 20 MG: 20 TABLET, FILM COATED ORAL at 20:59

## 2018-01-24 RX ADMIN — Medication 1 UNITS: at 22:08

## 2018-01-24 RX ADMIN — CEFAZOLIN SODIUM 2 G: 2 SOLUTION INTRAVENOUS at 08:00

## 2018-01-24 RX ADMIN — OXYCODONE HYDROCHLORIDE AND ACETAMINOPHEN 2 TABLET: 5; 325 TABLET ORAL at 03:46

## 2018-01-24 RX ADMIN — DOCUSATE SODIUM 100 MG: 100 CAPSULE ORAL at 08:00

## 2018-01-24 RX ADMIN — DULOXETINE HYDROCHLORIDE 30 MG: 30 CAPSULE, DELAYED RELEASE ORAL at 08:00

## 2018-01-24 RX ADMIN — INSULIN LISPRO 2 UNITS: 100 INJECTION, SOLUTION INTRAVENOUS; SUBCUTANEOUS at 08:46

## 2018-01-24 RX ADMIN — OXYCODONE HYDROCHLORIDE AND ACETAMINOPHEN 2 TABLET: 5; 325 TABLET ORAL at 08:01

## 2018-01-24 RX ADMIN — INSULIN LISPRO 2 UNITS: 100 INJECTION, SOLUTION INTRAVENOUS; SUBCUTANEOUS at 16:26

## 2018-01-24 ASSESSMENT — PAIN DESCRIPTION - PAIN TYPE
TYPE: SURGICAL PAIN;ACUTE PAIN
TYPE: SURGICAL PAIN
TYPE: ACUTE PAIN;SURGICAL PAIN

## 2018-01-24 ASSESSMENT — PAIN SCALES - GENERAL
PAINLEVEL_OUTOF10: 5
PAINLEVEL_OUTOF10: 7
PAINLEVEL_OUTOF10: 7
PAINLEVEL_OUTOF10: 5
PAINLEVEL_OUTOF10: 7
PAINLEVEL_OUTOF10: 6
PAINLEVEL_OUTOF10: 2
PAINLEVEL_OUTOF10: 8
PAINLEVEL_OUTOF10: 2
PAINLEVEL_OUTOF10: 6
PAINLEVEL_OUTOF10: 8

## 2018-01-24 ASSESSMENT — PAIN DESCRIPTION - DESCRIPTORS
DESCRIPTORS: ACHING;BURNING
DESCRIPTORS: ACHING

## 2018-01-24 ASSESSMENT — PAIN DESCRIPTION - LOCATION
LOCATION: BACK

## 2018-01-24 ASSESSMENT — PAIN DESCRIPTION - ORIENTATION
ORIENTATION: LOWER
ORIENTATION: LOWER

## 2018-01-24 NOTE — PROGRESS NOTES
[address] 60  INPATIENT OCCUPATIONAL THERAPY  [facility] ORTHOPEDICS 7K  EVALUATION    Time:  Time In: 1400  Time Out: 1438  Timed Code Treatment Minutes: 28 Minutes  Minutes: 38    Date: 2018  Patient Name: Lisbeth Valadez,   Gender: male      MRN: [id number]  : 1975  (43 y.o.)  Referring Practitioner: HOMERO Meléndez  Diagnosis: Degenerative Lumbar Spinal Stenosis  Additional Pertinent Hx: In summary, Anthony Waller is a 51-year-old male who is having constant lumbar pain that radiates pain in the bilateral buttocks, posterior thighs and left anterior thigh. It has been going on for about 2 months. No injury or fall to note. Currently has a VAS score of 8/10. Percentage wise, 90 percent of the pain is in the back and 10 percent legs. As it pertains to the legs, 75 percent left and 25 percent right. Activities that aggravate the pain include sitting, standing, walking, leaning forward, bending forward, rising from sitting, changing positions, coughing and driving. Denies any activities that help relieve the pain. Modifying factors include anti-inflammatories, muscle relaxers, heat, ice, TENS unit, chiropractor and epidural steroid injections. These modalities have provided him with mild relief over the years but really no lasting benefit. He did have a previous lumbar laminectomy done with Dr. Nancy Zamora in .     Restrictions/Precautions:  Restrictions/Precautions: General Precautions, ROM Restrictions, Fall Risk    Position Activity Restriction  Spinal Precautions: No Bending, No Lifting, No Twisting    Required Braces or Orthoses  Spinal: Lumbar Corset    Past Medical History:   Diagnosis Date    Anxiety     Diabetes mellitus ([facility] [address]     HIGH CHOLESTEROL     Pneumonia 2011    Hospitalized 4 days    Sleep apnea      Past Surgical History:   Procedure Laterality Date    KNEE ARTHROSCOPY  right knee    LUMBAR DISC SURGERY Bilateral 2013    decompressive lumbar L2-3, L4-5 5-S1 to Sit: Contact guard assistance  Sit to Supine: Stand by assistance  Scooting: Stand by assistance    Transfers  Sit to stand: Contact guard assistance  Stand to sit: Contact guard assistance  Transfer Comments: EOB and toilet. Toilet Transfers  Toilet - Technique: Ambulating  Equipment Used: Grab bars  Toilet Transfer: Contact guard assistance    Balance  Sitting Balance: Stand by assistance  Standing Balance: Contact guard assistance     Time: 3 minutes  Activity: self cares at sink      Functional Mobility  Functional - Mobility Device: Rolling Walker  Activity: Other, To/from bathroom  Assist Level: Contact guard assistance  Functional Mobility Comments: Around unit and to/from BR, Slow pace, No LOB,      Activity Tolerance:  Activity Tolerance: Patient limited by fatigue, Patient limited by pain    Treatment Initiated:  OT Evaluation completed. See above for details. Assessment:  Assessment: Pt requires skilled OT intervention to increase indep and safety with all self cares, IADLs, transfers, and functional mobility to decrease fall risk and return to PLOF. Performance deficits / Impairments: Decreased functional mobility , Decreased ADL status, Decreased safe awareness, Decreased strength, Decreased endurance  Prognosis: Good  Discharge Recommendations: Continue to assess pending progress    Clinical Decision Making: Clinical Decision making was of Moderate Complexity as the result of analysis of data from a detailed assessment, a consideration of several treatment options, the presence of comorbidities affecting the plan of care and the need for minimal to moderate modifications or assistance required to complete the evaluation. Patient Education:  Patient Education: OT POC, OT Role, Transfer safety, Walker safety, Self cares   Barriers to Learning: None     Equipment Recommendations:  Equipment Needed: No    Safety:  Safety Devices in place: Yes  Type of devices:  All fall risk precautions in

## 2018-01-24 NOTE — CARE COORDINATION
1/24/18, 1:19 PM      Vernon Stuart       Admitted from:  1/23/2018/ [facility] day: 1   Location: -01/001-A Reason for admit: Degenerative lumbar spinal stenosis [M48.061] Status: Inpatient  Admit order signed?: routed to Dr Gema Russo  PMH:  has a past medical history of Anxiety; Diabetes mellitus ([address] HIGH CHOLESTEROL; Pneumonia; and Sleep apnea. Procedure: 1/23/18 surgery by Dr Gema Russo: L2-5 DECOMPRESSION AND FUSION  Durotomy repair  Pertinent abnormal Imaging:no  Medications:  Scheduled Meds:   citalopram  20 mg Oral Daily    DULoxetine  30 mg Oral Daily    metFORMIN  1,000 mg Oral BID WC    simvastatin  40 mg Oral Nightly    sodium chloride flush  10 mL Intravenous 2 times per day    docusate sodium  100 mg Oral BID    famotidine  20 mg Oral BID    insulin lispro  0-6 Units Subcutaneous TID WC    insulin lispro  0-3 Units Subcutaneous Nightly     Continuous Infusions:   0.9% NaCl with KCl 20 mEq 125 mL/hr at 01/23/18 2200    dextrose 5 % and 0.9 % NaCl        Pertinent Info/Orders/Treatment Plan:  N/V checks. Pain management. Ileus prevention measures. Monitor wound drain output. Back brace. Diet: DIET CARB CONTROL; Dietary Nutrition Supplements: Other Oral Supplement (see comment)   DVT Prophylaxis: SCD's ordered and on  Smoking status:  reports that he has never smoked.  He has never used smokeless tobacco.   Influenza Vaccination Screening Completed: yes  Pneumonia Vaccination Screening Completed: yes  Core measures: vte  PCP: Madeleine Mark  Readmission:   no  Risk Score: 9.75     Discharge Planning  Current Residence:  Private Residence  Living Arrangements:  Spouse/Significant Other   Support Systems:  Spouse/Significant Other  Current Services PTA:   cpap  Potential Assistance Needed:  N/A  Potential Assistance Purchasing Medications:  No  Does patient want to participate in local refill/ meds to beds program?  No  Type of Home Care Services:  None  Patient expects to be

## 2018-01-24 NOTE — PROGRESS NOTES
Functional Limits    Vision: Impaired  Vision Exceptions: Wears glasses at all times    Hearing: Within functional limits    Pain:   . 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 Essential job duties include: prolonged static standing on concrete, prolonged ambulation and climbing 3 flights of steps. 12 hour shifts.      Objective:  RLE PROM: WFL    LLE PROM: WFL    Strength RLE: Exception  Comment: Hip Flexion 3-/5, Knee Extension 4+/5, Knee Flexion 5/5, Ankle Dorsiflexion 5/5    Strength LLE: Exception  Comment: Hip Flexion 3-/5, Knee Extension 4+/5, Knee Flexion 5/5, Ankle Dorsiflexion 5/5    Balance  Posture: Good  Sitting - Static: Good  Sitting - Dynamic: Good  Standing - Static: Fair, +  Standing - Dynamic: Fair  Comments: unsteadiness with pivoting on top step but still CGA, L hip flexor fatigue with decreased step length and foot shuffling during swing through  and with unsteadiness with prolonged ambulation     Scooting: Stand by assistance (to EOB in preparation of standing)    Transfers  Sit to Stand: Contact guard assistance (from EOB to RW, VC for hand placement, but patient pushed off legs and did not require additional assistance, no unsteadiness with standing)  Stand to sit: Contact guard assistance (VC to reach back for chair, controlled descent )  Ambulation 1  Surface: level tile  Device: Rolling Walker  Assistance: Contact guard assistance  Quality of Gait: wide YANG with poor hip flexion bilaterally with decreased foot clearance and flat foot weight acceptance. Poor L leg clearance with increased fatigue. Distance: 350 feet  Comments: no c/o pain throughout ambulation, demonstrated L hip flexor fatigue with poor L leg clearance     Stairs  # Steps : 4  Stairs Height: 6\"  Rails: Bilateral  Assistance: Contact guard assistance  Comment: Reciprocal stair stepping with unsteadiness with turning on top platform     Exercises:  Comments: sitting EOB AROM ankle pumps, long arc quads, marches x10 bilaterally for improved LE strength with ambualtion. Patient had difficulty performing hip marches due to decreased strength in hip flexors       Activity Tolerance:  Activity Tolerance: Patient Tolerated treatment well;Patient limited by fatigue  Activity Tolerance: Patient had decreased L hip flexion during prolonged ambulation affecting quality of gait     Treatment Initiated: see above    Assessment: Body structures, Functions, Activity limitations: Decreased functional mobility , Decreased ADL status, Decreased ROM, Decreased strength, Decreased high-level IADLs, Decreased balance, Decreased endurance  Assessment: Patient tolerated session well and was limited by LE fatigue. Patient was able to transfer CGA to RW with no unsteadines. Patient ambualted 250 feet with RW with above gait deviations. Patient has 3-/5 hip flexion bilaterally and fatigues easily during all hip flexion activities. Patient also negotiated 4 steps CGA with unsteadiness while turning to descend steps. Patient would benefit from continued therapy in order to improve hip flexion strength, ability to transfer, ambulate and negotiate stairs to maximize functional potential.   Prognosis: Good    Clinical Presentation: Moderate - Evolving with Changing Characteristics: Patient tolerated session well and was limited by LE fatigue. Patient was able to transfer CGA to RW with no unsteadines. Patient ambualted 250 feet with RW with above gait deviations.  Patient has 3-/5 hip flexion bilaterally and fatigues easily during all hip flexion activities. Patient also negotiated 4 steps CGA with unsteadiness while turning to descend steps. Patient would benefit from continued therapy in order to improve hip flexion strength, ability to transfer, ambulate and negotiate stairs to maximize functional potential.     Decision Making: High Complexitybased on patient assessment and decision making process of determining plan of care and establishing reasonable expectations for measurable functional outcomes    REQUIRES PT FOLLOW UP: Yes  Discharge Recommendations: Continue to assess pending progress, Home with assist PRN, Outpatient PT    Patient Education:  Patient Education: POC, possibility of performing outpatient PT for work hardening and improved hip flexion strength was discussed with patient being open to idea     Equipment Recommendations:  Equipment Needed: No    Safety:  Type of devices:  All fall risk precautions in place, Call light within reach, Gait belt, Left in chair  Restraints  Initially in place: No    Plan:  Times per week: 5x O  Times per day: Daily  Specific instructions for Next Treatment: continue hip flexion strengthening, ambulation with least restricitive assistive device, stair training   Current Treatment Recommendations: ROM, Strengthening, Balance Training, Functional Mobility Training, Transfer Training, Gait Training, Stair training, Endurance Training, Patient/Caregiver Education & Training    Goals:  Patient goals : to go home  Short term goals  Time Frame for Short term goals: 2 weeks  Short term goal 1: Patient will perform bed mobility SBA while maintaining spinal precautions  Short term goal 2: Patient will perform sit<>stand SBA to reach bedside chair  Short term goal 3: Patient will ambulate >250 feet with least restricitive assistive device SBA and appropriate foot clearance to walk around home safely  Short term goal 4: Patient will negotiate 2 steps

## 2018-01-25 LAB
ANION GAP SERPL CALCULATED.3IONS-SCNC: 10 MEQ/L (ref 8–16)
BUN BLDV-MCNC: 9 MG/DL (ref 7–22)
CALCIUM SERPL-MCNC: 8.4 MG/DL (ref 8.5–10.5)
CHLORIDE BLD-SCNC: 100 MEQ/L (ref 98–111)
CO2: 29 MEQ/L (ref 23–33)
CREAT SERPL-MCNC: 0.6 MG/DL (ref 0.4–1.2)
GFR SERPL CREATININE-BSD FRML MDRD: > 90 ML/MIN/1.73M2
GLUCOSE BLD-MCNC: 147 MG/DL (ref 70–108)
GLUCOSE BLD-MCNC: 171 MG/DL (ref 70–108)
GLUCOSE BLD-MCNC: 175 MG/DL (ref 70–108)
GLUCOSE BLD-MCNC: 177 MG/DL (ref 70–108)
GLUCOSE BLD-MCNC: 269 MG/DL (ref 70–108)
HCT VFR BLD CALC: 30.3 % (ref 42–52)
HEMOGLOBIN: 10.3 GM/DL (ref 14–18)
POTASSIUM REFLEX MAGNESIUM: 5 MEQ/L (ref 3.5–5.2)
SODIUM BLD-SCNC: 139 MEQ/L (ref 135–145)

## 2018-01-25 PROCEDURE — 1200000000 HC SEMI PRIVATE

## 2018-01-25 PROCEDURE — 85014 HEMATOCRIT: CPT

## 2018-01-25 PROCEDURE — 82948 REAGENT STRIP/BLOOD GLUCOSE: CPT

## 2018-01-25 PROCEDURE — 6370000000 HC RX 637 (ALT 250 FOR IP): Performed by: INTERNAL MEDICINE

## 2018-01-25 PROCEDURE — 6370000000 HC RX 637 (ALT 250 FOR IP): Performed by: PHYSICIAN ASSISTANT

## 2018-01-25 PROCEDURE — 97110 THERAPEUTIC EXERCISES: CPT

## 2018-01-25 PROCEDURE — 80048 BASIC METABOLIC PNL TOTAL CA: CPT

## 2018-01-25 PROCEDURE — 97535 SELF CARE MNGMENT TRAINING: CPT

## 2018-01-25 PROCEDURE — 97116 GAIT TRAINING THERAPY: CPT

## 2018-01-25 PROCEDURE — 2580000003 HC RX 258: Performed by: PHYSICIAN ASSISTANT

## 2018-01-25 PROCEDURE — 36415 COLL VENOUS BLD VENIPUNCTURE: CPT

## 2018-01-25 PROCEDURE — 85018 HEMOGLOBIN: CPT

## 2018-01-25 RX ADMIN — METFORMIN HYDROCHLORIDE 1000 MG: 500 TABLET ORAL at 17:07

## 2018-01-25 RX ADMIN — OXYCODONE HYDROCHLORIDE AND ACETAMINOPHEN 2 TABLET: 5; 325 TABLET ORAL at 02:53

## 2018-01-25 RX ADMIN — DULOXETINE HYDROCHLORIDE 30 MG: 30 CAPSULE, DELAYED RELEASE ORAL at 09:04

## 2018-01-25 RX ADMIN — SENNA 8.6 MG: 8.6 TABLET, COATED ORAL at 21:02

## 2018-01-25 RX ADMIN — CITALOPRAM 20 MG: 20 TABLET, FILM COATED ORAL at 09:05

## 2018-01-25 RX ADMIN — FAMOTIDINE 20 MG: 20 TABLET, FILM COATED ORAL at 09:04

## 2018-01-25 RX ADMIN — METFORMIN HYDROCHLORIDE 1000 MG: 500 TABLET ORAL at 09:04

## 2018-01-25 RX ADMIN — INSULIN LISPRO 3 UNITS: 100 INJECTION, SOLUTION INTRAVENOUS; SUBCUTANEOUS at 11:53

## 2018-01-25 RX ADMIN — SENNA 8.6 MG: 8.6 TABLET, COATED ORAL at 09:04

## 2018-01-25 RX ADMIN — INSULIN LISPRO 1 UNITS: 100 INJECTION, SOLUTION INTRAVENOUS; SUBCUTANEOUS at 17:05

## 2018-01-25 RX ADMIN — DOCUSATE SODIUM 100 MG: 100 CAPSULE ORAL at 09:04

## 2018-01-25 RX ADMIN — FAMOTIDINE 20 MG: 20 TABLET, FILM COATED ORAL at 21:03

## 2018-01-25 RX ADMIN — INSULIN LISPRO 1 UNITS: 100 INJECTION, SOLUTION INTRAVENOUS; SUBCUTANEOUS at 09:12

## 2018-01-25 RX ADMIN — OXYCODONE HYDROCHLORIDE AND ACETAMINOPHEN 2 TABLET: 5; 325 TABLET ORAL at 17:06

## 2018-01-25 RX ADMIN — DIAZEPAM 5 MG: 5 TABLET ORAL at 15:01

## 2018-01-25 RX ADMIN — CETIRIZINE HYDROCHLORIDE 10 MG: 10 TABLET ORAL at 09:04

## 2018-01-25 RX ADMIN — Medication 10 ML: at 21:05

## 2018-01-25 RX ADMIN — OXYCODONE HYDROCHLORIDE AND ACETAMINOPHEN 2 TABLET: 5; 325 TABLET ORAL at 21:06

## 2018-01-25 RX ADMIN — DOCUSATE SODIUM 100 MG: 100 CAPSULE ORAL at 21:03

## 2018-01-25 RX ADMIN — OXYCODONE HYDROCHLORIDE AND ACETAMINOPHEN 2 TABLET: 5; 325 TABLET ORAL at 11:56

## 2018-01-25 RX ADMIN — MAGNESIUM HYDROXIDE 30 ML: 400 SUSPENSION ORAL at 09:05

## 2018-01-25 RX ADMIN — Medication 10 ML: at 09:05

## 2018-01-25 RX ADMIN — DIAZEPAM 5 MG: 5 TABLET ORAL at 09:08

## 2018-01-25 RX ADMIN — SIMVASTATIN 40 MG: 40 TABLET, FILM COATED ORAL at 21:03

## 2018-01-25 RX ADMIN — Medication 1 UNITS: at 21:03

## 2018-01-25 RX ADMIN — OXYCODONE HYDROCHLORIDE AND ACETAMINOPHEN 2 TABLET: 5; 325 TABLET ORAL at 07:52

## 2018-01-25 ASSESSMENT — PAIN DESCRIPTION - LOCATION
LOCATION: BACK

## 2018-01-25 ASSESSMENT — PAIN SCALES - GENERAL
PAINLEVEL_OUTOF10: 7
PAINLEVEL_OUTOF10: 7
PAINLEVEL_OUTOF10: 6
PAINLEVEL_OUTOF10: 7
PAINLEVEL_OUTOF10: 6
PAINLEVEL_OUTOF10: 4
PAINLEVEL_OUTOF10: 6
PAINLEVEL_OUTOF10: 4
PAINLEVEL_OUTOF10: 5
PAINLEVEL_OUTOF10: 6
PAINLEVEL_OUTOF10: 7
PAINLEVEL_OUTOF10: 5
PAINLEVEL_OUTOF10: 7
PAINLEVEL_OUTOF10: 7
PAINLEVEL_OUTOF10: 0

## 2018-01-25 ASSESSMENT — PAIN DESCRIPTION - ORIENTATION
ORIENTATION: LOWER;MID
ORIENTATION: LOWER

## 2018-01-25 ASSESSMENT — PAIN DESCRIPTION - PAIN TYPE
TYPE: SURGICAL PAIN
TYPE: ACUTE PAIN;SURGICAL PAIN
TYPE: SURGICAL PAIN

## 2018-01-25 ASSESSMENT — PAIN DESCRIPTION - DESCRIPTORS
DESCRIPTORS: ACHING
DESCRIPTORS: DISCOMFORT

## 2018-01-25 NOTE — PROGRESS NOTES
Mehulashelykaren Saucedo 60  INPATIENT OCCUPATIONAL THERAPY  [facility] ORTHOPEDICS 7K  DAILY NOTE    Time:  Time In: 9152  Time Out: 9131  Timed Code Treatment Minutes: 29 Minutes  Minutes: 29          Date: 2018  Patient Name: Kimberly Randle   Gender: male      Room: [facility]01/001-A  MRN: [id number]  : 1975  (43 y.o.)  Referring Practitioner: HOMERO Martinez  Diagnosis: Degenerative Lumbar Spinal Stenosis  Additional Pertinent Hx: In summary, Pramod Quinn is a 77-year-old male who is having constant lumbar pain that radiates pain in the bilateral buttocks, posterior thighs and left anterior thigh. It has been going on for about 2 months. No injury or fall to note. Currently has a VAS score of 8/10. Percentage wise, 90 percent of the pain is in the back and 10 percent legs. As it pertains to the legs, 75 percent left and 25 percent right. Activities that aggravate the pain include sitting, standing, walking, leaning forward, bending forward, rising from sitting, changing positions, coughing and driving. Denies any activities that help relieve the pain. Modifying factors include anti-inflammatories, muscle relaxers, heat, ice, TENS unit, chiropractor and epidural steroid injections. These modalities have provided him with mild relief over the years but really no lasting benefit. He did have a previous lumbar laminectomy done with Dr. Siri Walters in .     Restrictions/Precautions:  Restrictions/Precautions: General Precautions, ROM Restrictions, Fall Risk            Position Activity Restriction  Spinal Precautions: No Bending, No Lifting, No Twisting    Required Braces or Orthoses  Spinal: Lumbar Corset    Past Medical History:   Diagnosis Date    Anxiety     Diabetes mellitus ([facility] [address]     HIGH CHOLESTEROL     Pneumonia 2011    Hospitalized 4 days    Sleep apnea      Past Surgical History:   Procedure Laterality Date    KNEE ARTHROSCOPY  right knee    LUMBAR DISC SURGERY Bilateral 2013    decompressive

## 2018-01-25 NOTE — PROGRESS NOTES
precautions    Equipment Recommendations:  Equipment Needed: No    Safety:  Type of devices:  All fall risk precautions in place, Call light within reach, Gait belt, Left in bed, Nurse notified  Restraints  Initially in place: No    Plan:  Times per week: 5x O  Times per day: Daily  Specific instructions for Next Treatment: continue hip flexion strengthening, ambulation with least restricitive assistive device, stair training   Current Treatment Recommendations: ROM, Strengthening, Balance Training, Functional Mobility Training, Transfer Training, Gait Training, Stair training, Endurance Training, Patient/Caregiver Education & Training    Goals:  Patient goals : to go home    Short term goals  Time Frame for Short term goals: 2 weeks  Short term goal 1: Patient will perform bed mobility SBA while maintaining spinal precautions  Short term goal 2: Patient will perform sit<>stand SBA to reach bedside chair  Short term goal 3: Patient will ambulate >250 feet with least restricitive assistive device SBA and appropriate foot clearance to walk around home safely  Short term goal 4: Patient will negotiate 2 steps SBA to enter home safely  Short term goal 5: Patient will perform car transfer CGA to leave hospital    Long term goals  Time Frame for Long term goals : N/A due to short ELOS         AM-PAC Inpatient Mobility Raw Score : 18  AM-PAC Inpatient T-Scale Score : 43.63  Mobility Inpatient CMS 0-100% Score: 46.58  Mobility Inpatient CMS G-Code Modifier : CK

## 2018-01-26 VITALS
TEMPERATURE: 97.5 F | SYSTOLIC BLOOD PRESSURE: 131 MMHG | DIASTOLIC BLOOD PRESSURE: 71 MMHG | RESPIRATION RATE: 18 BRPM | HEIGHT: 67 IN | BODY MASS INDEX: 44.86 KG/M2 | OXYGEN SATURATION: 97 % | HEART RATE: 75 BPM | WEIGHT: 285.8 LBS

## 2018-01-26 LAB
ANION GAP SERPL CALCULATED.3IONS-SCNC: 13 MEQ/L (ref 8–16)
BUN BLDV-MCNC: 10 MG/DL (ref 7–22)
CALCIUM SERPL-MCNC: 8.3 MG/DL (ref 8.5–10.5)
CHLORIDE BLD-SCNC: 98 MEQ/L (ref 98–111)
CO2: 26 MEQ/L (ref 23–33)
CREAT SERPL-MCNC: 0.5 MG/DL (ref 0.4–1.2)
GFR SERPL CREATININE-BSD FRML MDRD: > 90 ML/MIN/1.73M2
GLUCOSE BLD-MCNC: 177 MG/DL (ref 70–108)
GLUCOSE BLD-MCNC: 187 MG/DL (ref 70–108)
HCT VFR BLD CALC: 29.8 % (ref 42–52)
HEMOGLOBIN: 10.3 GM/DL (ref 14–18)
POTASSIUM REFLEX MAGNESIUM: 4.1 MEQ/L (ref 3.5–5.2)
SODIUM BLD-SCNC: 137 MEQ/L (ref 135–145)

## 2018-01-26 PROCEDURE — 97110 THERAPEUTIC EXERCISES: CPT

## 2018-01-26 PROCEDURE — 80048 BASIC METABOLIC PNL TOTAL CA: CPT

## 2018-01-26 PROCEDURE — 82948 REAGENT STRIP/BLOOD GLUCOSE: CPT

## 2018-01-26 PROCEDURE — 6370000000 HC RX 637 (ALT 250 FOR IP): Performed by: PHYSICIAN ASSISTANT

## 2018-01-26 PROCEDURE — 97116 GAIT TRAINING THERAPY: CPT

## 2018-01-26 PROCEDURE — 6370000000 HC RX 637 (ALT 250 FOR IP): Performed by: INTERNAL MEDICINE

## 2018-01-26 PROCEDURE — 36415 COLL VENOUS BLD VENIPUNCTURE: CPT

## 2018-01-26 PROCEDURE — 85018 HEMOGLOBIN: CPT

## 2018-01-26 PROCEDURE — 85014 HEMATOCRIT: CPT

## 2018-01-26 PROCEDURE — 97535 SELF CARE MNGMENT TRAINING: CPT

## 2018-01-26 RX ADMIN — MAGNESIUM HYDROXIDE 30 ML: 400 SUSPENSION ORAL at 08:58

## 2018-01-26 RX ADMIN — INSULIN LISPRO 1 UNITS: 100 INJECTION, SOLUTION INTRAVENOUS; SUBCUTANEOUS at 11:25

## 2018-01-26 RX ADMIN — OXYCODONE HYDROCHLORIDE AND ACETAMINOPHEN 1 TABLET: 5; 325 TABLET ORAL at 09:03

## 2018-01-26 RX ADMIN — OXYCODONE HYDROCHLORIDE AND ACETAMINOPHEN 2 TABLET: 5; 325 TABLET ORAL at 12:45

## 2018-01-26 RX ADMIN — DIAZEPAM 5 MG: 5 TABLET ORAL at 05:45

## 2018-01-26 RX ADMIN — OXYCODONE HYDROCHLORIDE AND ACETAMINOPHEN 2 TABLET: 5; 325 TABLET ORAL at 03:09

## 2018-01-26 RX ADMIN — METFORMIN HYDROCHLORIDE 1000 MG: 500 TABLET ORAL at 08:59

## 2018-01-26 RX ADMIN — BISACODYL 10 MG: 10 SUPPOSITORY RECTAL at 11:15

## 2018-01-26 RX ADMIN — SENNA 8.6 MG: 8.6 TABLET, COATED ORAL at 08:59

## 2018-01-26 RX ADMIN — CITALOPRAM 20 MG: 20 TABLET, FILM COATED ORAL at 08:59

## 2018-01-26 RX ADMIN — DOCUSATE SODIUM 100 MG: 100 CAPSULE ORAL at 08:59

## 2018-01-26 RX ADMIN — DULOXETINE HYDROCHLORIDE 30 MG: 30 CAPSULE, DELAYED RELEASE ORAL at 08:59

## 2018-01-26 RX ADMIN — FAMOTIDINE 20 MG: 20 TABLET, FILM COATED ORAL at 08:59

## 2018-01-26 RX ADMIN — INSULIN LISPRO 1 UNITS: 100 INJECTION, SOLUTION INTRAVENOUS; SUBCUTANEOUS at 08:59

## 2018-01-26 ASSESSMENT — PAIN DESCRIPTION - PROGRESSION: CLINICAL_PROGRESSION: GRADUALLY IMPROVING

## 2018-01-26 ASSESSMENT — PAIN SCALES - GENERAL
PAINLEVEL_OUTOF10: 4
PAINLEVEL_OUTOF10: 7
PAINLEVEL_OUTOF10: 4
PAINLEVEL_OUTOF10: 4
PAINLEVEL_OUTOF10: 0
PAINLEVEL_OUTOF10: 4
PAINLEVEL_OUTOF10: 7

## 2018-01-26 ASSESSMENT — PAIN DESCRIPTION - PAIN TYPE
TYPE: SURGICAL PAIN
TYPE: ACUTE PAIN;SURGICAL PAIN
TYPE: SURGICAL PAIN

## 2018-01-26 ASSESSMENT — PAIN DESCRIPTION - DESCRIPTORS
DESCRIPTORS: DISCOMFORT
DESCRIPTORS: ACHING

## 2018-01-26 ASSESSMENT — PAIN DESCRIPTION - LOCATION
LOCATION: BACK

## 2018-01-26 ASSESSMENT — PAIN DESCRIPTION - ORIENTATION: ORIENTATION: LOWER;MID

## 2018-01-26 ASSESSMENT — PAIN DESCRIPTION - FREQUENCY: FREQUENCY: INTERMITTENT

## 2018-01-26 NOTE — PLAN OF CARE
Problem: Pain:  Goal: Pain level will decrease  Pain level will decrease   Outcome: Ongoing  Patient verbalizes pain is able to be controlled with prn pain medications. Receiving PO Percocet PRN - able to reach pain goal of 4/10 with interventions. Goal: Control of acute pain  Control of acute pain   Outcome: Completed Date Met: 36/66/05  Duplicate. Goal: Control of chronic pain  Control of chronic pain   Outcome: Completed Date Met: 39/67/61  Duplicate. Problem: Risk for Impaired Skin Integrity  Goal: Tissue integrity - skin and mucous membranes  Structural intactness and normal physiological function of skin and  mucous membranes. Outcome: Ongoing  Patient's skin is appropriate for ethnicity, warm, and dry, with no new skin issues noted this shift. Mucous membranes are pink, moist, and intact. Problem: Falls - Risk of  Goal: Absence of falls  Outcome: Ongoing  No falls noted this shift, patient uses call light appropriately and waits for staff prior to transferring self. Patient able to voice needs appropriately. Gait is steady with walker/ bace brace/ gait belt and staff assistance. Problem: SAFETY  Goal: Free from accidental physical injury  Outcome: Ongoing  Patient free from accidental injury. Goal: Free from intentional harm  Outcome: Ongoing  Patient free from intentional harm this shift. Problem: DAILY CARE  Goal: Daily care needs are met  Outcome: Ongoing  Patient's daily care needs continue to be met, wife able to assist with ADL's and patient only needs set up assistance. Patient A+O x 4 and can use call light appropriately. Problem: KNOWLEDGE DEFICIT  Goal: Patient/S.O. demonstrates understanding of disease process, treatment plan, medications, and discharge instructions.   Outcome: Ongoing  Patient and wife demonstrate understanding of disease process, treatment plan, medications, and discharge instructions - able to transfer showing appropriate knowledge of Spinal

## 2018-01-26 NOTE — PROGRESS NOTES
Maria Elenakaren Saucedo 60  INPATIENT OCCUPATIONAL THERAPY  [facility] ORTHOPEDICS 7K  DAILY NOTE    Time:  Time In: 4266  Time Out: 8346  Timed Code Treatment Minutes: 11 Minutes  Minutes: 11    Date: 2018  Patient Name: Marisabel Dow,   Gender: male      Room: [facility]01/001-A  MRN: [id number]  : 1975  (37 y.o.)  Referring Practitioner: HOMERO Knowles  Diagnosis: Degenerative Lumbar Spinal Stenosis  Additional Pertinent Hx: In summary, Alla Dhillon is a 49-year-old male who is having constant lumbar pain that radiates pain in the bilateral buttocks, posterior thighs and left anterior thigh. It has been going on for about 2 months. No injury or fall to note. Currently has a VAS score of 8/10. Percentage wise, 90 percent of the pain is in the back and 10 percent legs. As it pertains to the legs, 75 percent left and 25 percent right. Activities that aggravate the pain include sitting, standing, walking, leaning forward, bending forward, rising from sitting, changing positions, coughing and driving. Denies any activities that help relieve the pain. Modifying factors include anti-inflammatories, muscle relaxers, heat, ice, TENS unit, chiropractor and epidural steroid injections. These modalities have provided him with mild relief over the years but really no lasting benefit. He did have a previous lumbar laminectomy done with Dr. Ronda Herring in .     Restrictions/Precautions:  Restrictions/Precautions: General Precautions, ROM Restrictions, Fall Risk    Position Activity Restriction  Spinal Precautions: No Bending, No Lifting, No Twisting    Required Braces or Orthoses  Spinal: Lumbar Corset    Past Medical History:   Diagnosis Date    Anxiety     Diabetes mellitus ([address]     HIGH CHOLESTEROL     Pneumonia 2011    Hospitalized 4 days    Sleep apnea      Past Surgical History:   Procedure Laterality Date    KNEE ARTHROSCOPY  right knee    LUMBAR DISC SURGERY Bilateral 2013    decompressive lumbar L2-3,

## 2018-01-26 NOTE — PROGRESS NOTES
INT removed, Hemovac drain \"fell out per patient\" Dressing change completed and taught to wife through [facility]. Patient had medium bowel movement. Patient dressed and belongings packed. Patient given dc instructions and taught through teachback method. Patient and his wife deny any questions or concerns about dc. Central transport to take patient to [facility].

## 2018-01-26 NOTE — PROGRESS NOTES
after BM.  Continue drain until patient has BM  5:  Left posterior leg pain- hold off on decadron for now until blood sugar better controlled    [employer]

## 2018-01-26 NOTE — PROGRESS NOTES
Physical Therapy   [address] [facility]    Time In: 0826  Time Out: 3831  Timed Code Treatment Minutes: 34 Minutes  Minutes: 29          Date: 2018  Patient Name: Kimberly Randle,  Gender:  male        MRN: [id number]  : 1975  (37 y.o.)     Referring Practitioner: Rosalba Paz PA-C  Diagnosis: degnerative lumbar spinal stenosis   Additional Pertinent Hx: Patient is a 43year old male who presents s/p L2-3 and L4-5 decompression and fusion on 18. Patient had been experiencing severe low back pain with radiation into bilateral buttocks, posterior thighs and L anterior thigh. Patient had been experiencing symptoms for 2 months with no history of falling. Symptoms limited his ability to stand or walk for prolonged periods of time which he does on a regular basis at work. Patient has no significant PMH. Past Medical History:   Diagnosis Date    Anxiety     Diabetes mellitus ([facility] [address]     HIGH CHOLESTEROL     Pneumonia     Hospitalized 4 days    Sleep apnea      Past Surgical History:   Procedure Laterality Date    KNEE ARTHROSCOPY  right knee    LUMBAR DISC SURGERY Bilateral 2013    decompressive lumbar L2-3, L4-5 5-S1 laminectomies and foraminotomies    POSTERIOR SEGMENTAL INSTRUMENTATION 3-6 VRT SEG N/A 2018    L2-3 AND L4-5 DECOMPRESSION L2-3 AND L4-5 POSTERIOR FUSION AND TLIF performed by Lori Shea MD at [facility]      WISDOM TOOTH EXTRACTION         Restrictions/Precautions:  Restrictions/Precautions: General Precautions, ROM Restrictions, Fall Risk            Position Activity Restriction  Spinal Precautions: No Bending, No Lifting, No Twisting    Required Braces or Orthoses  Spinal: Lumbar Corset    Subjective:     Subjective: RN approved session. Upon arrival pt sitting EOB with wife present states pt would like to use restroom.  Pt agreeable to amb and perform therex following restroom use. Pain:   .  Pain Assessment  Pain Level: 4       Social/Functional:  Lives With: Spouse  Type of Home: House  Home Layout: One level  Home Access: Stairs to enter without rails  Entrance Stairs - Number of Steps: 2 FIFI  Home Equipment: Rolling walker     Objective:  Scooting: Stand by assistance    Transfers  Sit to Stand: Contact guard assistance (from EOB and toilet, minor cues for hand placement)  Stand to sit: Stand by assistance       Ambulation 1  Surface: level tile  Device: Rolling Walker  Assistance: Stand by assistance  Quality of Gait: decrease liz, decrease step length, min foot clearance, steady, no LOB  Distance: 030hel3       Exercises:  Exercises  Comments: pt completed BLE seated ex x10;heel/toe raises, glut set. marches, hip abd/add, and long arch quads all to increase LE strength for improved functional mobility. Activity Tolerance:  Activity Tolerance: Patient Tolerated treatment well    Assessment: Body structures, Functions, Activity limitations: Decreased functional mobility , Decreased ADL status, Decreased ROM, Decreased strength, Decreased high-level IADLs, Decreased balance, Decreased endurance  Assessment: Pt tolerated session well at this time with no c/o increase pian. Pt pleasant and talkativate throughout session. Pt requested to remain on EOB instead of chair at end of session. RN present and ok'd. Prognosis: Good  REQUIRES PT FOLLOW UP: Yes  Discharge Recommendations: Continue to assess pending progress, Home with assist PRN, Outpatient PT    Patient Education:  Patient Education: ambualtion, transfers, POC    Equipment Recommendations:  Equipment Needed: No    Safety:  Type of devices:  All fall risk precautions in place, Call light within reach, Gait belt, Left in bed, Nurse notified  Restraints  Initially in place: No    Plan:  Times per week: 5x O  Times per day: Daily  Specific instructions for Next Treatment: continue hip flexion strengthening,

## 2018-03-08 ENCOUNTER — HOSPITAL ENCOUNTER (OUTPATIENT)
Dept: PHYSICAL THERAPY | Age: 43
Setting detail: THERAPIES SERIES
Discharge: HOME | End: 2018-03-08
Payer: COMMERCIAL

## 2018-03-08 PROCEDURE — 97110 THERAPEUTIC EXERCISES: CPT

## 2018-03-08 PROCEDURE — 97161 PT EVAL LOW COMPLEX 20 MIN: CPT

## 2018-03-08 NOTE — PROGRESS NOTES
lunmbar spine,                           Activity Tolerance:  Activity Tolerance: Patient Tolerated treatment well  Activity Tolerance: weakness left LE, more tightness in the right hip . Assessment: Body structures, Functions, Activity limitations: Decreased functional mobility , Decreased ADL status, Decreased ROM, Decreased strength, Decreased high-level IADLs, Decreased endurance  Assessment: decrease ability to reach feet to put on shoes. will benefit from skilled PT for progression of postural education   Prognosis: Excellent       Patient Education:     Avoid trunk ROM                  Plan:  Times per week: 3  Plan weeks: 3-4 (scheduled 3 weeks due to possbile RTW on April2nd)   Specific instructions for Next Treatment: NO LROM,  stabs, body mechnics strengthening,  hip ROM   Current Treatment Recommendations: Strengthening, ADL/Self-care Training, Endurance Training    History: Personal factors or comorbidities that impact plan of care - Low Complexity: no personal factors or comorbidities    Examination: Body structures and functions, activity limitations, participation restrictions; using standardized tests and measures - Low Complexity: 1-2 body structures and functional, activity limitation and/or participation restrictions. See restrictions and objective section above for details. Clinical Presentation: Low - Stable and Uncomplicated: see above    Decision Making: Low Complexity due to expect good outcome with POC. Decision making was based on patient assessment and decision making process in determining plan of care and establishing reasonable expectations for measurable functional outcomes. Evaluation Complexity: Based on the findings of patient history, examination, clinical presentation, and decision making during this evaluation, the evaluation of Cortney Stuart  is of low complexity.     Goals:  Patient goals : reach foot to put n socks, return to work without reinjury    Short term goals  Time Frame for Short term goals: see long term goal     Long term goals  Long term goal 1: increase single knee to chest to 130, hip ER to 45  B in order to dress with neutral lumbar spine  Long term goal 2: patient will deomonstrate neutral lumbar spine while touching picking up shoe from tiehr floor  Long term goal 3: patient will demonstrate good body mechanics with squatting, and moving objects from 1 surface to another in order to RTW without reinjury  Long term goal 4: increase B LE and core strength to 5/5   Long term goal 5: I home program          Shavonne Barnes, PT

## 2018-03-13 ENCOUNTER — HOSPITAL ENCOUNTER (OUTPATIENT)
Dept: PHYSICAL THERAPY | Age: 43
Setting detail: THERAPIES SERIES
Discharge: HOME | End: 2018-03-13
Payer: COMMERCIAL

## 2018-03-13 PROCEDURE — 97110 THERAPEUTIC EXERCISES: CPT

## 2018-03-13 NOTE — PROGRESS NOTES
Stacey Saucedo 60  OUTPATIENT PHYSICAL THERAPY  DAILY NOTE  [facility]     Time In: 1301  Time Out: 0619  Minutes: 42  Timed Code Treatment Minutes: 44 Minutes     Date: 3/13/2018  Patient Name: Marcelo Meza,  Gender:  male        CSN: [id number]   : 1975  (37 y.o.)  Referral Date : 18    Referring Practitioner: Dr Shani Benjamin      Diagnosis: degnerative disc, lumbar region,   aftercare following  MS surgery  Treatment Diagnosis: SP fusion, Lumbar IV disc degeneration, weakness,    Additional Pertinent Hx: Patient s/p L2-3 and L4-5 decompression and fusion on 18. Patient had been experiencing severe low back pain with radiation into bilateral buttocks, posterior thighs and L anterior thigh. Leg pain was relived immediately after decompression                   General:  PT Visit Information  PT Insurance Information: [employer] pat, pays at 100%   Total # of Visits Approved: 45  Total # of Visits to Date: 1  Plan of Care/Certification Expiration Date: 18               Subjective:  Comments: difficulty with reaching feet, concerned with being strong enough for RTW on     Other (Comment): orders-- eval and treat 3 times for 4 weeks,      Subjective: no pain today. Muscle felt a little strain after last visit. Objective                             Exercises  Exercise 1: AVOID LROM due to fusion   Nu Step-- level5  5 min  arms 8 , legs 9   Exercise 2: standing toe raises, mini squats, marching , lunges with  stabs 10 reps, hamstring stretch & calf strech 15 sec for 3 NK table 7.5# 10 flexion & extension   Exercise 3: single knee to chest, leg cross for hip ER  B diagonal knee to chest  3 for 15 sec. Exercise 4: abd stabs  supine -- abd sets, glut set, quad sets  x10 , alternate arms, marching for legs.   arms and legs x 10   Exercise 9: lumbar roll for maintaining neutral lunmbar spine         Activity Tolerance:  Activity Tolerance: Patient

## 2018-03-14 ENCOUNTER — HOSPITAL ENCOUNTER (OUTPATIENT)
Dept: PHYSICAL THERAPY | Age: 43
Setting detail: THERAPIES SERIES
Discharge: HOME | End: 2018-03-14
Payer: COMMERCIAL

## 2018-03-14 PROCEDURE — 97110 THERAPEUTIC EXERCISES: CPT

## 2018-03-14 NOTE — PROGRESS NOTES
Maria Elenakaren Gallozachery 60  OUTPATIENT PHYSICAL THERAPY  DAILY NOTE  [facility]     Time In: 8124  Time Out: 0930  Minutes: 40  Timed Code Treatment Minutes: 40 Minutes     Date: 3/14/2018  Patient Name: Gonzalo Collier,  Gender:  male        CSN: [id number]   : 1975  (37 y.o.)  Referral Date : 18    Referring Practitioner: Dr Mayra Valdez      Diagnosis: degnerative disc, lumbar region,   aftercare following  MS surgery  Treatment Diagnosis: SP fusion, Lumbar IV disc degeneration, weakness,    Additional Pertinent Hx: Patient s/p L2-3 and L4-5 decompression and fusion on 18. Patient had been experiencing severe low back pain with radiation into bilateral buttocks, posterior thighs and L anterior thigh. Leg pain was relived immediately after decompression                   General:  PT Visit Information  PT Insurance Information: [employer]   no ionto, pays at 100%   Total # of Visits Approved: 45  Total # of Visits to Date: 2  Plan of Care/Certification Expiration Date: 18               Subjective:  Comments: difficulty with reaching feet, concerned with being strong enough for RTW on     Other (Comment): orders-- eval and treat 3 times for 4 weeks,      Subjective: Pt stated feeling well today. Pt stated being able to sleep all night with no LBP. Pt stated he does has some numbness in R thigh. Pain:  Patient Currently in Pain: Denies         Objective                                                                                                                          Exercises  Exercise 1: AVOID LROM due to fusion   Nu Step-- level5  5 min  arms 8 , legs 9   Exercise 2: standing in // bars:  toe raises, mini squats, marching , lunges with  stabs 10 reps, hamstring stretch & calf strech 15 sec for 3 NK table 7.5# 10 flexion & extension   Exercise 3: single knee to chest, leg cross for hip ER  B diagonal knee to chest  3 for 15 sec.    Exercise 4: abd stabs  supine -- abd

## 2018-03-16 ENCOUNTER — HOSPITAL ENCOUNTER (OUTPATIENT)
Dept: PHYSICAL THERAPY | Age: 43
Setting detail: THERAPIES SERIES
Discharge: HOME | End: 2018-03-16
Payer: COMMERCIAL

## 2018-03-16 PROCEDURE — 97110 THERAPEUTIC EXERCISES: CPT

## 2018-03-16 NOTE — PROGRESS NOTES
Tolerance:  Activity Tolerance: Patient Tolerated treatment well    Assessment: Body structures, Functions, Activity limitations: Decreased functional mobility , Decreased ADL status, Decreased ROM, Decreased strength, Decreased high-level IADLs, Decreased endurance  Prognosis: Excellent  Discharge Recommendations: Continue to assess pending progress    Patient Education:  Patient Education: abdominal bracing, there. ex, cont with LE stretches                      Plan:  Times per week: 3  Plan weeks: 3-4 (scheduled 3 weeks due to possbile RTW on April2nd)   Specific instructions for Next Treatment: NO LROM,  stabs, body mechnics strengthening,  hip ROM   Current Treatment Recommendations: Strengthening, ADL/Self-care Training, Endurance Training  Plan Comment: continue per POC     Goals:  Patient goals : reach foot to put n socks, return to work without reinjury    Short term goals  Time Frame for Short term goals: see long term goal     Long term goals  Long term goal 1: increase single knee to chest to 130, hip ER to 45  B in order to dress with neutral lumbar spine  Long term goal 2: patient will deomonstrate neutral lumbar spine while touching picking up shoe from tiehr floor  Long term goal 3: patient will demonstrate good body mechanics with squatting, and moving objects from 1 surface to another in order to RTW without reinjury  Long term goal 4: increase B LE and core strength to 5/5   Long term goal 5: I home program          Sudhir Fournier  [id number]

## 2018-03-19 ENCOUNTER — APPOINTMENT (OUTPATIENT)
Dept: PHYSICAL THERAPY | Age: 43
End: 2018-03-19
Payer: COMMERCIAL

## 2018-03-21 ENCOUNTER — HOSPITAL ENCOUNTER (OUTPATIENT)
Dept: PHYSICAL THERAPY | Age: 43
Setting detail: THERAPIES SERIES
Discharge: HOME | End: 2018-03-21
Payer: COMMERCIAL

## 2018-03-21 PROCEDURE — 97110 THERAPEUTIC EXERCISES: CPT

## 2018-03-23 ENCOUNTER — HOSPITAL ENCOUNTER (OUTPATIENT)
Dept: PHYSICAL THERAPY | Age: 43
Setting detail: THERAPIES SERIES
Discharge: HOME | End: 2018-03-23
Payer: COMMERCIAL

## 2018-03-23 PROCEDURE — 97110 THERAPEUTIC EXERCISES: CPT

## 2018-03-26 ENCOUNTER — HOSPITAL ENCOUNTER (OUTPATIENT)
Dept: PHYSICAL THERAPY | Age: 43
Setting detail: THERAPIES SERIES
Discharge: HOME | End: 2018-03-26
Payer: COMMERCIAL

## 2018-03-26 PROCEDURE — 97110 THERAPEUTIC EXERCISES: CPT

## 2018-03-26 NOTE — PROGRESS NOTES
Stacey Saucedo 60  OUTPATIENT PHYSICAL THERAPY  DAILY NOTE  Katherine Briggs     Time In: 9893  Time Out: 0930  Minutes: 35  Timed Code Treatment Minutes: 35 Minutes     Date: 3/26/2018  Patient Name: Johny Del Rio,  Gender:  male        CSN: [id number]   : 1975  (37 y.o.)  Referral Date : 18    Referring Practitioner: Dr Toribio Frances      Diagnosis: degnerative disc, lumbar region,   aftercare following  MS surgery  Treatment Diagnosis: SP fusion, Lumbar IV disc degeneration, weakness,    Additional Pertinent Hx: Patient s/p L2-3 and L4-5 decompression and fusion on 18. Patient had been experiencing severe low back pain with radiation into bilateral buttocks, posterior thighs and L anterior thigh.   Leg pain was relived immediately after decompression                   General:  PT Visit Information  PT Insurance Information: [employer]   no ionto, pays at 100%   Total # of Visits Approved: 45  Total # of Visits to Date: 6  Plan of Care/Certification Expiration Date: 18               Subjective:  Chart Reviewed: Yes  Response To Previous Treatment: Not applicable  Comments: difficulty with reaching feet, concerned with being strong enough for RTW on     Other (Comment): orders-- eval and treat 3 times for 4 weeks,      Subjective: Pt stated feeling some mm soreness from increased activity level  over the weekend     Pain:  Patient Currently in Pain: No         Objective                                                                                                                          Exercises  Exercise 1: AVOID LROM due to fusion   Nu Step-- level5  5 min  arms 8 , legs 9   Exercise 2: standing in // bars:  toe raises, mini squats, marching , 3-way hip no resistance x 10,  lunges in // x 10  Exercise 3:  hamstring stretch & calf strech 15 sec X3  Exercise 4:  NK table 7.5# X 10ea  flexion & extension with 2 sets  Exercise 7: single knee to chest, leg cross for hip ER  B diagonal knee to chest  3 for 15 sec. Exercise 14: all ex. to increase stability of lumbar spine         Activity Tolerance:  Activity Tolerance: Patient Tolerated treatment well    Assessment: Body structures, Functions, Activity limitations: Decreased ADL status, Decreased ROM, Decreased strength, Decreased high-level IADLs, Decreased endurance, Decreased functional mobility   Assessment: Pt toelrated session well but demonstrated increased stiffness during stretches. Pt was 10 min. late for appt. Pt reminded of body mechanics with lifting due to returning to work next week. Continue with skilled PT to increase ROM of B LE and stability of lumbar spine to return to functional moblity. Prognosis: Excellent  Discharge Recommendations: Continue to assess pending progress    Patient Education:  Patient Education: abdominal bracing, there. ex, cont with LE stretches                      Plan:  Times per week: 3  Plan weeks: 3-4 (scheduled 3 weeks due to possbile RTW on April2nd)   Specific instructions for Next Treatment: NO LROM,  stabs, body mechnics strengthening,  hip ROM   Current Treatment Recommendations: Strengthening, ADL/Self-care Training, Endurance Training  Plan Comment: continue per POC     Goals:  Patient goals : reach foot to put n socks, return to work without reinjury    Short term goals  Time Frame for Short term goals: see long term goal     Long term goals  Long term goal 1: increase single knee to chest to 130, hip ER to 45  B in order to dress with neutral lumbar spine  Long term goal 2: patient will deomonstrate neutral lumbar spine while touching picking up shoe from tiehr floor  Long term goal 3: patient will demonstrate good body mechanics with squatting, and moving objects from 1 surface to another in order to RTW without reinjury  Long term goal 4: increase B LE and core strength to 5/5   Long term goal 5: I home program          Radha Santiago

## 2018-03-28 ENCOUNTER — HOSPITAL ENCOUNTER (OUTPATIENT)
Dept: PHYSICAL THERAPY | Age: 43
Setting detail: THERAPIES SERIES
Discharge: HOME | End: 2018-03-28
Payer: COMMERCIAL

## 2018-03-28 PROCEDURE — 97110 THERAPEUTIC EXERCISES: CPT

## 2018-03-28 NOTE — PROGRESS NOTES
objects from 1 surface to another in order to RTW without reinjury  Long term goal 4: increase B LE and core strength to 5/5   Long term goal 5: I home program          Jeet Rosales

## 2018-03-29 ENCOUNTER — HOSPITAL ENCOUNTER (OUTPATIENT)
Dept: PHYSICAL THERAPY | Age: 43
Setting detail: THERAPIES SERIES
Discharge: HOME | End: 2018-03-29
Payer: COMMERCIAL

## 2018-03-29 PROCEDURE — 97110 THERAPEUTIC EXERCISES: CPT

## 2018-06-02 ENCOUNTER — APPOINTMENT (OUTPATIENT)
Dept: GENERAL RADIOLOGY | Age: 43
End: 2018-06-02
Payer: COMMERCIAL

## 2018-06-02 ENCOUNTER — HOSPITAL ENCOUNTER (EMERGENCY)
Age: 43
Discharge: HOME | End: 2018-06-02
Attending: EMERGENCY MEDICINE
Payer: COMMERCIAL

## 2018-06-02 VITALS
HEART RATE: 85 BPM | SYSTOLIC BLOOD PRESSURE: 125 MMHG | RESPIRATION RATE: 20 BRPM | OXYGEN SATURATION: 97 % | TEMPERATURE: 97 F | DIASTOLIC BLOOD PRESSURE: 85 MMHG

## 2018-06-02 DIAGNOSIS — S80.02XA CONTUSION OF LEFT KNEE, INITIAL ENCOUNTER: Primary | ICD-10-CM

## 2018-06-02 DIAGNOSIS — Z02.6 ENCOUNTER RELATED TO WORKER'S COMPENSATION CLAIM: ICD-10-CM

## 2018-06-02 PROCEDURE — 73564 X-RAY EXAM KNEE 4 OR MORE: CPT

## 2018-06-02 PROCEDURE — L1830 KO IMMOB CANVAS LONG PRE OTS: HCPCS

## 2018-06-02 PROCEDURE — 99283 EMERGENCY DEPT VISIT LOW MDM: CPT

## 2018-06-02 RX ORDER — OXYCODONE HYDROCHLORIDE AND ACETAMINOPHEN 5; 325 MG/1; MG/1
1 TABLET ORAL EVERY 6 HOURS PRN
Qty: 12 TABLET | Refills: 0 | Status: SHIPPED | OUTPATIENT
Start: 2018-06-02 | End: 2018-06-05

## 2018-06-02 RX ORDER — IBUPROFEN 600 MG/1
600 TABLET ORAL EVERY 6 HOURS PRN
Qty: 30 TABLET | Refills: 0 | Status: SHIPPED | OUTPATIENT
Start: 2018-06-02 | End: 2018-07-02

## 2018-06-02 ASSESSMENT — PAIN DESCRIPTION - LOCATION: LOCATION: KNEE

## 2018-06-02 ASSESSMENT — PAIN SCALES - GENERAL: PAINLEVEL_OUTOF10: 9

## 2018-06-02 ASSESSMENT — PAIN DESCRIPTION - ORIENTATION: ORIENTATION: LEFT

## 2018-06-28 ENCOUNTER — HOSPITAL ENCOUNTER (OUTPATIENT)
Dept: MRI IMAGING | Age: 43
Discharge: HOME | End: 2018-06-28
Payer: COMMERCIAL

## 2018-06-28 DIAGNOSIS — S80.02XA CONTUSION OF LEFT KNEE, INITIAL ENCOUNTER: ICD-10-CM

## 2018-06-28 DIAGNOSIS — S83.402A SPRAIN OF COLLATERAL LIGAMENT OF LEFT KNEE, INITIAL ENCOUNTER: ICD-10-CM

## 2018-06-28 PROCEDURE — 73721 MRI JNT OF LWR EXTRE W/O DYE: CPT

## 2018-07-26 ENCOUNTER — HOSPITAL ENCOUNTER (OUTPATIENT)
Dept: PHYSICAL THERAPY | Age: 43
Setting detail: THERAPIES SERIES
Discharge: HOME | End: 2018-07-26
Payer: COMMERCIAL

## 2018-07-26 PROCEDURE — 97110 THERAPEUTIC EXERCISES: CPT

## 2018-07-26 PROCEDURE — 97161 PT EVAL LOW COMPLEX 20 MIN: CPT

## 2018-07-26 ASSESSMENT — PAIN SCALES - GENERAL: PAINLEVEL_OUTOF10: 3

## 2018-07-26 ASSESSMENT — PAIN DESCRIPTION - FREQUENCY: FREQUENCY: INTERMITTENT

## 2018-07-26 ASSESSMENT — PAIN DESCRIPTION - LOCATION: LOCATION: KNEE

## 2018-07-26 ASSESSMENT — PAIN DESCRIPTION - ORIENTATION: ORIENTATION: LEFT;INNER

## 2018-07-26 ASSESSMENT — PAIN DESCRIPTION - PAIN TYPE: TYPE: ACUTE PAIN

## 2018-07-26 NOTE — PROGRESS NOTES
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 Edema: mild  in th eleft knee         LLE AROM : Exceptions  L SLR: 60, right 65   L Knee Flexion 0-145: 130, RIght 135,   creptius from -30 to 0 degrees of extension   L Knee Extension 0: 0 , right 0                     Strength RLE: WFL  R Knee Flexion: 5/5  R Knee Extension: 5/5    Strength LLE: Exception  L Knee Flexion: 4-/5 (painin medial joint line with resistane)  L Knee Extension: 4/5          Special Tests: stand pivot with closed chain on the left  -- increased pain in the medial joint line    Other: lig test neg Ant /post drawer and medial /lateral stress                    Ambulation 1  Surface: carpet  Device: No Device  Quality of Gait: normal on level surfaces.                     Exercises  Exercise 1: Nu step   5 min  arms 8 , seat 8   Exercise 2: standing  toe raise, marching, mini squats x 10,   Exercise 3: 4 way hip B x 10   Exercise 4: NK talbe flex , ext 5# 10 reps,   Exercise 10: all exercise to increase strength and mobility of the right knee to incresae functional mobility              Activity Tolerance:  Activity Tolerance: Patient Tolerated

## 2018-07-30 ENCOUNTER — HOSPITAL ENCOUNTER (OUTPATIENT)
Dept: PHYSICAL THERAPY | Age: 43
Setting detail: THERAPIES SERIES
Discharge: HOME | End: 2018-07-30
Payer: COMMERCIAL

## 2018-07-30 PROCEDURE — 97110 THERAPEUTIC EXERCISES: CPT

## 2018-07-30 NOTE — PROGRESS NOTES
raises                      Plan:  Plan Comment: continue with POC    Goals:  Patient goals : less pain in the left knee, return to previous actvitiy without sharp apin in the knee joint on the left     Short term goals  Time Frame for Short term goals: 4 weeks  Short term goal 1: increase strength of the left knee ext to 4+, flex 4 without pain with resist  Short term goal 2: increase Rom 0 to 130   Short term goal 3: Jaay Lorenz will perform 20 reps of Left LE PRE without increased LE symptoms after     Long term goals  Time Frame for Long term goals : 8 weeks or 12 visits   Long term goal 1:  rom 0 to 135 so he is able to walk up and down steps without railing alternating feet  Long term goal 2: increase left knee strength to 5/5 to stabilize his knee so he can walk on all sufaces without reinjury  Long term goal 3:  I home program          Karissa Childs, PT

## 2018-08-01 ENCOUNTER — HOSPITAL ENCOUNTER (OUTPATIENT)
Dept: PHYSICAL THERAPY | Age: 43
Setting detail: THERAPIES SERIES
Discharge: HOME | End: 2018-08-01
Payer: COMMERCIAL

## 2018-08-01 PROCEDURE — 97110 THERAPEUTIC EXERCISES: CPT

## 2018-08-01 NOTE — PROGRESS NOTES
with good tolerance. Pt to continue with skilled PT to increase strength in L knee for stability with gait. Prognosis: Good  Discharge Recommendations: Continue to assess pending progress    Patient Education:  Patient Education: there. ex                      Plan:  Times per week: 2-3   Plan weeks: 8  Current Treatment Recommendations: Strengthening, ROM, Balance Training, Home Exercise Program, Modalities, Stair training  Plan Comment: continue with POC    Goals:  Patient goals : less pain in the left knee, return to previous actvitiy without sharp apin in the knee joint on the left     Short term goals  Time Frame for Short term goals: 4 weeks  Short term goal 1: increase strength of the left knee ext to 4+, flex 4 without pain with resist  Short term goal 2: increase Rom 0 to 130   Short term goal 3: Kelley Jovel will perform 20 reps of Left LE PRE without increased LE symptoms after     Long term goals  Time Frame for Long term goals : 8 weeks or 12 visits   Long term goal 1:  rom 0 to 135 so he is able to walk up and down steps without railing alternating feet  Long term goal 2: increase left knee strength to 5/5 to stabilize his knee so he can walk on all sufaces without reinjury  Long term goal 3:  I home program          Meli Alva

## 2018-08-03 ENCOUNTER — APPOINTMENT (OUTPATIENT)
Dept: PHYSICAL THERAPY | Age: 43
End: 2018-08-03
Payer: COMMERCIAL

## 2018-08-08 ENCOUNTER — HOSPITAL ENCOUNTER (OUTPATIENT)
Dept: PHYSICAL THERAPY | Age: 43
Setting detail: THERAPIES SERIES
Discharge: HOME | End: 2018-08-08
Payer: COMMERCIAL

## 2018-08-08 ENCOUNTER — HOSPITAL ENCOUNTER (OUTPATIENT)
Dept: PHYSICAL THERAPY | Age: 43
Setting detail: THERAPIES SERIES
End: 2018-08-08
Payer: COMMERCIAL

## 2018-08-08 PROCEDURE — 97110 THERAPEUTIC EXERCISES: CPT

## 2018-08-08 ASSESSMENT — PAIN DESCRIPTION - ORIENTATION: ORIENTATION: LEFT

## 2018-08-08 ASSESSMENT — PAIN SCALES - GENERAL: PAINLEVEL_OUTOF10: 1

## 2018-08-08 ASSESSMENT — PAIN DESCRIPTION - LOCATION: LOCATION: KNEE

## 2018-08-08 ASSESSMENT — PAIN DESCRIPTION - PAIN TYPE: TYPE: ACUTE PAIN

## 2018-08-08 NOTE — PROGRESS NOTES
[facility]  OUTPATIENT PHYSICAL THERAPY  DAILY NOTE  Page Lombardo     Time In: 1430  Time Out: 1500  Minutes: 30  Timed Code Treatment Minutes: 30 Minutes     Date: 2018  Patient Name: Oriana Carlos,  Gender:  male        CSN: [id number]   : 1975  (37 y.o.)  Referral Date : 18    Referring Practitioner: Zaina Reilly CNP      Diagnosis: contusion, sprain  left knee,   Treatment Diagnosis: left knee sprain, contusion   Additional Pertinent Hx: back surgery 18, foot drop on  the left due to back history , right knee surgery 215 years ago . General:  PT Visit Information  Onset Date: 18  PT Insurance Information:    approved for 12 visit   Total # of Visits Approved: 12  Total # of Visits to Date: 4  Plan of Care/Certification Expiration Date: 18               Subjective:  Comments:  injury-- immobilizer 4-5 weeks, then hinged brace,  Steriods helped with pain  Other (Comment):  fell at work -- twisted and fell on the left knee         Subjective: Pt stated L knee gets irritated if sitting to long. Pain:  Patient Currently in Pain: Yes  Pain Level: 1  Pain Type: Acute pain  Pain Location: Knee  Pain Orientation: Left      Objective                  Exercises  Exercise 1: Nu step   5 min  arms 8 , seat 8, level 5   Exercise 2: standing on blue foam:  toe raise, marching, mini squats x 15  Exercise 3: 4 way hip B x 15 with yellow tband  Exercise 4: NK table  flex , ext 5# 10 reps with 2 sets: Bilateral   Exercise 5: hamstring stretch on step 3 for 15 sec. , calf stretch 15 sec. X3  Exercise 6: LLE SLR X20 reps , hip abd X20 reps  Exercise 10: all exercise to increase strength and mobility of the right knee to increase functional mobility         Activity Tolerance:  Activity Tolerance: Patient Tolerated treatment well    Assessment:   Body structures, Functions, Activity limitations: Decreased functional mobility , Decreased ROM, Decreased high-level IADLs, Decreased strength  Assessment: Pt tolerated session well. Added theraband and standing on blue foam with ex. this session and educated pt to monitor symptoms. Pt continued to work on B LE strengthening and L knee stability  to return to [address]. Prognosis: Good  Discharge Recommendations: Continue to assess pending progress    Patient Education:  Patient Education: there. ex, ice for pain                      Plan:  Times per week: 2-3   Plan weeks: 8  Current Treatment Recommendations: Strengthening, ROM, Balance Training, Home Exercise Program, Modalities, Stair training  Plan Comment: continue with POC    Goals:  Patient goals : less pain in the left knee, return to previous actvitiy without sharp apin in the knee joint on the left     Short term goals  Time Frame for Short term goals: 4 weeks  Short term goal 1: increase strength of the left knee ext to 4+, flex 4 without pain with resist  Short term goal 2: increase Rom 0 to 130   Short term goal 3: Aron Grover will perform 20 reps of Left LE PRE without increased LE symptoms after     Long term goals  Time Frame for Long term goals : 8 weeks or 12 visits   Long term goal 1:  rom 0 to 135 so he is able to walk up and down steps without railing alternating feet  Long term goal 2: increase left knee strength to 5/5 to stabilize his knee so he can walk on all sufaces without reinjury  Long term goal 3:  I home program          Clair Ryan

## 2018-08-10 ENCOUNTER — CLINICAL DOCUMENTATION (OUTPATIENT)
Dept: PHYSICAL THERAPY | Age: 43
End: 2018-08-10

## 2018-08-10 ENCOUNTER — APPOINTMENT (OUTPATIENT)
Dept: PHYSICAL THERAPY | Age: 43
End: 2018-08-10
Payer: COMMERCIAL

## 2018-08-10 NOTE — PROGRESS NOTES
[facility]  PHYSICAL THERAPY MISSED TREATMENT NOTE  OUTPATIENT REHABILITATION    Date: 8/10/2018  Patient Name: Rojas Jiang        MRN: [id number]   : 1975  (37 y.o.)  Gender: male                REASON FOR MISSED TREATMENT:  No Show/No Call. Patient was called with next scheduled appointment. Dhruv Bray. [id number]

## 2018-08-13 ENCOUNTER — HOSPITAL ENCOUNTER (OUTPATIENT)
Dept: PHYSICAL THERAPY | Age: 43
Setting detail: THERAPIES SERIES
Discharge: HOME | End: 2018-08-13
Payer: COMMERCIAL

## 2018-08-13 PROCEDURE — 97110 THERAPEUTIC EXERCISES: CPT

## 2018-08-13 ASSESSMENT — PAIN DESCRIPTION - LOCATION: LOCATION: KNEE

## 2018-08-13 ASSESSMENT — PAIN SCALES - GENERAL: PAINLEVEL_OUTOF10: 1

## 2018-08-13 ASSESSMENT — PAIN DESCRIPTION - ORIENTATION: ORIENTATION: LEFT

## 2018-08-13 ASSESSMENT — PAIN DESCRIPTION - PAIN TYPE: TYPE: ACUTE PAIN

## 2018-08-17 ENCOUNTER — HOSPITAL ENCOUNTER (OUTPATIENT)
Dept: PHYSICAL THERAPY | Age: 43
Setting detail: THERAPIES SERIES
Discharge: HOME | End: 2018-08-17
Payer: COMMERCIAL

## 2018-08-17 PROCEDURE — 97110 THERAPEUTIC EXERCISES: CPT

## 2018-08-17 NOTE — PROGRESS NOTES
[facility]  OUTPATIENT PHYSICAL THERAPY  DAILY NOTE  Lupe Riggs     Time In: 36  Time Out: 1200  Minutes: 30  Timed Code Treatment Minutes: 30 Minutes     Date: 2018  Patient Name: Mily Nunes,  Gender:  male        CSN: [id number]   : 1975  (37 y.o.)  Referral Date : 18    Referring Practitioner: Nicole Kirk CNP      Diagnosis: contusion, sprain  left knee,   Treatment Diagnosis: left knee sprain, contusion   Additional Pertinent Hx: back surgery 18, foot drop on  the left due to back history , right knee surgery 215 years ago . General:  PT Visit Information  Onset Date: 18  PT Insurance Information:    approved for 12 visit   Total # of Visits Approved: 12  Total # of Visits to Date: 6  Plan of Care/Certification Expiration Date: 18               Subjective:  Chart Reviewed: Yes  Other (Comment):  fell at work -- twisted and fell on the left knee         Subjective: Reports feeling a little sore after work. Pain:  Patient Currently in Pain: No         Objective                                                                                                                          Exercises  Exercise 1: Nu step   5 min  arms 8 , seat 8, level 5   Exercise 2: standing on blue foam:  toe raise, marching, mini squats x 15  Exercise 3: 4 way hip B x 10 with red tband, rockerboard fwd,lat x 1`5 & 10 sec balance   Exercise 4: NK table  flex , ext 5# 10 reps with 2 sets: Bilateral   Exercise 5: hamstring stretch on step 3 for 15 sec. , calf stretch 15 sec. X3  Exercise 7: step ups on 4 in. step fwd./lat. X10 reps  Exercise 10: all exercise to increase strength and mobility of the right knee to increase functional mobility         Activity Tolerance:  Activity Tolerance: Patient Tolerated treatment well    Assessment:   Body structures, Functions, Activity limitations: Decreased functional mobility , Decreased ROM, Decreased

## 2018-08-20 ENCOUNTER — HOSPITAL ENCOUNTER (OUTPATIENT)
Dept: PHYSICAL THERAPY | Age: 43
Setting detail: THERAPIES SERIES
Discharge: HOME | End: 2018-08-20
Payer: COMMERCIAL

## 2018-08-20 PROCEDURE — 97110 THERAPEUTIC EXERCISES: CPT

## 2018-08-20 NOTE — PROGRESS NOTES
Patient Tolerated treatment well    Assessment:  Assessment: Mild weakness remains in the left LE. mild decreased ROM . Pt to continue with skilled PT to increase strength and stability of L knee to return to prior level of function. Prognosis: Good       Patient Education:  Patient Education: will continue with POC.                        Plan:  Times per week: 2-3   Plan weeks: 4  Current Treatment Recommendations: Strengthening, ROM, Balance Training, Home Exercise Program, Modalities, Stair training    Goals:  Patient goals : less pain in the left knee, return to previous actvitiy without sharp apin in the knee joint on the left     Short term goals  Time Frame for Short term goals: 4 weeks  Short term goal 1: increase strength of the left knee ext to 4+, flex 4 without pain with resist  Short term goal 2: increase ROM 0 to 130   Short term goal 3: Shania Hahn will perform 20 reps of Left LE PRE without increased LE symptoms after     Long term goals  Time Frame for Long term goals : 8 weeks or 12 visits   Long term goal 1:  rom 0 to 135 so he is able to walk up and down steps without railing alternating feet  Long term goal 2: increase left knee strength to 5/5 to stabilize his knee so he can walk on all sufaces without reinjury  Long term goal 3:  I home program          Lucila Curtis, PT

## 2018-08-23 ENCOUNTER — HOSPITAL ENCOUNTER (OUTPATIENT)
Dept: PHYSICAL THERAPY | Age: 43
Setting detail: THERAPIES SERIES
Discharge: HOME | End: 2018-08-23
Payer: COMMERCIAL

## 2018-08-23 PROCEDURE — 97110 THERAPEUTIC EXERCISES: CPT

## 2018-08-23 NOTE — PROGRESS NOTES
[facility]  OUTPATIENT PHYSICAL THERAPY  DAILY NOTE  Monserrat Outlaw     Time In: 4406  Time Out: 0873  Minutes: 40  Timed Code Treatment Minutes: 40 Minutes     Date: 2018  Patient Name: Claudia Vital,  Gender:  male        CSN: [id number]   : 1975  (37 y.o.)  Referral Date : 18    Referring Practitioner: Bg Morales CNP      Diagnosis: contusion, sprain  left knee,   Treatment Diagnosis: left knee sprain, contusion   Additional Pertinent Hx: back surgery 18, foot drop on  the left due to back history , right knee surgery 215 years ago . General:  PT Visit Information  Onset Date: 18  PT Insurance Information:    approved for 12 visit   Total # of Visits Approved: 12  Total # of Visits to Date: 8  Plan of Care/Certification Expiration Date: 18               Subjective:  Chart Reviewed: Yes  Other (Comment):  fell at work -- twisted and fell on the left knee         Subjective: Reports feeling good today     Pain:  Patient Currently in Pain: No         Objective                                                                                                                          Exercises  Exercise 1: Nu step   5 min  arms 8 , seat 8, level 5   Exercise 2: standing on blue foam:  toe raise, marching, mini squats x 20  Exercise 3: 4 way hip B x 10 with green tband, rockerboard fwd,lat x 20  & 10 sec balance   Exercise 4: NK table  flex, ext 7.5# 10 reps with 2 sets: Bilateral   Exercise 5: hamstring stretch on step 3 for 15 sec. , calf stretch 15 sec. X3  Exercise 7: step ups on 4 in. step fwd./lat. X10 reps  Exercise 10: all exercise to increase strength and mobility of the left knee to increase functional mobility         Activity Tolerance:  Activity Tolerance: Patient Tolerated treatment well    Assessment:   Body structures, Functions, Activity limitations: Decreased functional mobility , Decreased ROM, Decreased high-level

## 2018-08-28 ENCOUNTER — APPOINTMENT (OUTPATIENT)
Dept: PHYSICAL THERAPY | Age: 43
End: 2018-08-28
Payer: COMMERCIAL

## 2018-08-28 ENCOUNTER — CLINICAL DOCUMENTATION (OUTPATIENT)
Dept: PHYSICAL THERAPY | Age: 43
End: 2018-08-28

## 2018-08-31 ENCOUNTER — HOSPITAL ENCOUNTER (OUTPATIENT)
Dept: PHYSICAL THERAPY | Age: 43
Setting detail: THERAPIES SERIES
Discharge: HOME | End: 2018-08-31
Payer: COMMERCIAL

## 2018-08-31 PROCEDURE — 97110 THERAPEUTIC EXERCISES: CPT

## 2018-08-31 NOTE — PROGRESS NOTES
IADLs, Decreased strength  Prognosis: Good  Discharge Recommendations: Continue to assess pending progress    Patient Education:  Patient Education: will continue with POC.                        Plan:  Times per week: 2-3   Plan weeks: 4  Current Treatment Recommendations: Strengthening, ROM, Balance Training, Home Exercise Program, Modalities, Stair training  Plan Comment: continue with POC    Goals:  Patient goals : less pain in the left knee, return to previous actvitiy without sharp apin in the knee joint on the left     Short term goals  Time Frame for Short term goals: 4 weeks  Short term goal 1: increase strength of the left knee ext to 4+, flex 4 without pain with resist-- MET  see long term goals  Short term goal 2: increase ROM 0 to 130   MET  see long term goal   Short term goal 3: Diamond Aleman will perform 20 reps of Left LE PRE without increased LE symptoms after MET  see long term goal    Long term goals  Time Frame for Long term goals : 8 weeks or 12 visits   Long term goal 1:  rom 0 to 135 so he is able to walk up and down steps without railing alternating feet  MET For ROM not met for steps contineu goal  Long term goal 2: increase left knee strength to 5/5 to stabilize his knee so he can walk on all sufaces without reinjury-- not met  sandy 4+/5 contineu goal   Long term goal 3:  I home program Shivani Merchant  [id number]

## 2018-09-05 ENCOUNTER — HOSPITAL ENCOUNTER (OUTPATIENT)
Dept: PHYSICAL THERAPY | Age: 43
Setting detail: THERAPIES SERIES
Discharge: HOME | End: 2018-09-05
Payer: COMMERCIAL

## 2018-09-05 PROCEDURE — 97110 THERAPEUTIC EXERCISES: CPT

## 2018-09-05 NOTE — PROGRESS NOTES
[facility]  OUTPATIENT PHYSICAL THERAPY  DAILY NOTE  Spencer Nehemiah     Time In: 6868  Time Out: 930  Minutes: 40  Timed Code Treatment Minutes: 40 Minutes     Date: 2018  Patient Name: Darryle Solomon,  Gender:  male        CSN: [id number]   : 1975  (37 y.o.)  Referral Date : 18    Referring Practitioner: Agusto Christy CNP      Diagnosis: contusion, sprain  left knee,   Treatment Diagnosis: left knee sprain, contusion   Additional Pertinent Hx: back surgery 18, foot drop on  the left due to back history , right knee surgery 215 years ago . General:  PT Visit Information  Onset Date: 18  PT Insurance Information:    approved for 12 visit   Total # of Visits Approved: 12  Total # of Visits to Date: 10  Plan of Care/Certification Expiration Date: 18               Subjective:  Chart Reviewed: Yes  Other (Comment):  fell at work -- twisted and fell on the left knee         Subjective: Patient denies pain, just working on strengthening the knee. He used to have stiffness with standing up after sitting too long but that has been better. Pain:  Patient Currently in Pain: No         Objective    Exercises  Exercise 1: Nu step   5 min  arms 8 , seat 8, level 5   Exercise 2: standing on blue foam:  toe raise, marching, mini squats x 20   Exercise 3: 4 way hip B x 15 with green tband, rockerboard fwd,lat x 20  & 30 sec balance   Exercise 4: NK table  flex, ext 7.5# x20 reps Bilateral   Exercise 5: hamstring stretch on step 3 for 15 sec. , calf stretch 15 sec. X3   Exercise 6: SLR X20 reps , hip abd X20 reps   Exercise 7: step ups on 6 in. step fwd./lat. X10 reps, added step down with 4 inch step x10 each with one UE  Exercise 10: all exercise to increase strength and mobility of the left knee to increase functional mobility         Activity Tolerance:  Activity Tolerance: Patient Tolerated treatment well    Assessment:   Body structures, Functions, Activity limitations: Decreased functional mobility , Decreased ROM, Decreased high-level IADLs, Decreased strength  Assessment: Patient feels stronger, no longer having pain. Denies difficulty with squatting, kneeling, climbing stairs. Patient agreeable to discharge next session. Prognosis: Good  Discharge Recommendations: Continue to assess pending progress    Patient Education:  Patient Education: will continue with POC.                        Plan:  Times per week: 2-3   Plan weeks: 4  Current Treatment Recommendations: Strengthening, ROM, Balance Training, Home Exercise Program, Modalities, Stair training  Plan Comment: continue with POC    Goals:  Patient goals : less pain in the left knee, return to previous actvitiy without sharp apin in the knee joint on the left     Short term goals  Time Frame for Short term goals: 4 weeks    Long term goals  Time Frame for Long term goals : 8 weeks or 12 visits   Long term goal 1:  rom 0 to 135 so he is able to walk up and down steps without railing alternating feet  MET For ROM not met for steps contineu goal  Long term goal 2: increase left knee strength to 5/5 to stabilize his knee so he can walk on all sufaces without reinjury-- not met  sandy 4+/5 contineu goal   Long term goal 3:  I home program [address] Yariel Humphries, PT

## 2018-09-10 ENCOUNTER — HOSPITAL ENCOUNTER (OUTPATIENT)
Dept: PHYSICAL THERAPY | Age: 43
Setting detail: THERAPIES SERIES
Discharge: HOME | End: 2018-09-10
Payer: COMMERCIAL

## 2018-09-10 PROCEDURE — 97530 THERAPEUTIC ACTIVITIES: CPT

## 2018-09-10 NOTE — DISCHARGE SUMMARY
[facility]  OUTPATIENT PHYSICAL THERAPY  DISCHARGE NOTE  Abhishek Ro     Time In: 1500  Time Out: 1320  Minutes: 38  Timed Code Treatment Minutes: 45 Minutes     Date: 9/10/2018  Patient Name: Mireille Montesinos,  Gender:  male        CSN: [id number]   : 1975  (37 y.o.)  Referral Date : 18    Referring Practitioner: Negro Melendez CNP      Diagnosis: contusion, sprain  left knee,   Treatment Diagnosis: left knee sprain, contusion   Additional Pertinent Hx: back surgery 18, foot drop on the left due to back history , right knee surgery previously. General:  PT Visit Information  Onset Date: 18  PT Insurance Information:    approved for 12 visit   Total # of Visits Approved: 12  Total # of Visits to Date: 6  Plan of Care/Certification Expiration Date: 18               Subjective:  Chart Reviewed: Yes  Other (Comment):  fell at work -- twisted and fell on the left knee         Subjective: denies      Pain:  Patient Currently in Pain: No         Objective             ROM  Left knee wfl, strength 5/5   Gait   Normal on level surfaces and sasha to walk up and down steps with minimal use of handrail with alternating feet. Exercises  Exercise 1: Nu step   5 min  arms 8 , seat 8, level 5   Exercise 2: standing on blue foam:  toe raise, marching, mini squats x 20   Exercise 3: 4 way hip B x 15 with green tband, rockerboard fwd,lat x 20  & 30 sec balance   Exercise 4: NK table  flex, ext 7.5# x20 reps Bilateral   Exercise 5: hamstring stretch on step 3 for 15 sec. , calf stretch 15 sec. X3   Exercise 7: step ups on 6 in. step fwd./lat.  X10 reps, added step down with 4 inch step x10 each with one UE  Exercise 10: all exercise to increase strength and mobility of the left knee to increase functional mobility         Activity Tolerance:  Activity Tolerance: Patient Tolerated treatment well    Assessment:  Assessment: Patient feels

## (undated) DEVICE — SOLUTION IV IRRIG POUR BRL 0.9% SODIUM CHL 2F7124

## (undated) DEVICE — SOLUTION IV IRRIG WATER 1000ML POUR BRL 2F7114

## (undated) DEVICE — PAD,NON-ADHERENT,3X8,STERILE,LF,1/PK: Brand: MEDLINE

## (undated) DEVICE — CODMAN® SURGICAL PATTIES 1" X 1" (2.54CM X 2.54CM): Brand: CODMAN®

## (undated) DEVICE — GLOVE SURG SZ 65 THK91MIL LTX FREE SYN POLYISOPRENE

## (undated) DEVICE — GLOVE ORANGE PI 7   MSG9070

## (undated) DEVICE — GLOVE SURG SZ 7 L12IN FNGR THK94MIL TRNSLUC YEL LTX HYDRGEL

## (undated) DEVICE — SPONGE GZ W4XL4IN COT 12 PLY TYP VII WVN C FLD DSGN

## (undated) DEVICE — PROBE STIM 3 MM FOR PEDCL SCREW DISP

## (undated) DEVICE — SOLUTION IV 1000ML 0.9% SOD CHL PH 5 INJ USP VIAFLX PLAS

## (undated) DEVICE — SET DRN PVC DBL RND W/ TRCR 1/8 IN MID PERF 12 H PAT

## (undated) DEVICE — 3M™ MEDIPORE™ SOFT CLOTH TAPE, 4 INCH X 10 YARDS, 12 ROLLS/CASE, 2964: Brand: 3M™ MEDIPORE™

## (undated) DEVICE — THE MILL DISPOSABLE - MEDIUM

## (undated) DEVICE — NEEDLE SPNL L3.5IN PNK HUB S STL REG WALL FIT STYL W/ QNCKE

## (undated) DEVICE — GLOVE ORANGE PI 8 1/2   MSG9085

## (undated) DEVICE — 3M™ BAIR HUGGER® MULTI ACCESS BLANKET, PEDIATRIC, FULL BODY, 10 PER CASE 31000: Brand: BAIR HUGGER™

## (undated) DEVICE — BIPOLAR SEALER 23-112-1 AQM 6.0: Brand: AQUAMANTYS ®

## (undated) DEVICE — GAUZE,SPONGE,8"X4",12PLY,XRAY,STRL,LF: Brand: MEDLINE

## (undated) DEVICE — YANKAUER,BULB TIP,W/O VENT,RIGID,STERILE: Brand: MEDLINE

## (undated) DEVICE — BLADE CLIPPER GEN PURP NS

## (undated) DEVICE — GLOVE ORANGE PI 8   MSG9080

## (undated) DEVICE — INTEGUSEAL MICROBIAL SEALANT: Brand: AVANOS

## (undated) DEVICE — GOWN,SIRUS,NON REINFRCD,LARGE,SET IN SL: Brand: MEDLINE

## (undated) DEVICE — HEAD POSITIONER, SURGICAL: Brand: DEROYAL

## (undated) DEVICE — TUBING, SUCTION, 1/4" X 20', STRAIGHT: Brand: MEDLINE INDUSTRIES, INC.

## (undated) DEVICE — EVACUATOR SURG 400CC PVC 3 SPR BLB FOR WND DRNGE

## (undated) DEVICE — 4.0MM PRECISION ROUND

## (undated) DEVICE — GLOVE ORANGE PI 7 1/2   MSG9075